# Patient Record
Sex: FEMALE | Race: BLACK OR AFRICAN AMERICAN | NOT HISPANIC OR LATINO | Employment: PART TIME | URBAN - METROPOLITAN AREA
[De-identification: names, ages, dates, MRNs, and addresses within clinical notes are randomized per-mention and may not be internally consistent; named-entity substitution may affect disease eponyms.]

---

## 2019-03-22 ENCOUNTER — HISTORICAL (OUTPATIENT)
Dept: LAB | Facility: HOSPITAL | Age: 19
End: 2019-03-22

## 2019-03-22 LAB
APPEARANCE, UA: CLEAR
BACTERIA SPEC CULT: NORMAL /HPF
BILIRUB UR QL STRIP: NEGATIVE
COLOR UR: YELLOW
GLUCOSE (UA): NEGATIVE
HGB UR QL STRIP: NEGATIVE
KETONES UR QL STRIP: NEGATIVE
LEUKOCYTE ESTERASE UR QL STRIP: NEGATIVE
NITRITE UR QL STRIP: NEGATIVE
PH UR STRIP: 5.5 [PH] (ref 5–9)
PROT UR QL STRIP: NEGATIVE
RBC #/AREA URNS HPF: NORMAL /[HPF]
SP GR UR STRIP: 1.02 (ref 1–1.03)
SQUAMOUS EPITHELIAL, UA: NORMAL
UROBILINOGEN UR STRIP-ACNC: 0.2
WBC #/AREA URNS HPF: NORMAL /[HPF]

## 2019-03-24 LAB — FINAL CULTURE: NO GROWTH

## 2022-02-08 DIAGNOSIS — O26.872 CERVICAL SHORTENING AFFECTING PREGNANCY IN SECOND TRIMESTER: Primary | ICD-10-CM

## 2022-02-08 DIAGNOSIS — J45.909 ASTHMA AFFECTING PREGNANCY IN SECOND TRIMESTER: ICD-10-CM

## 2022-02-08 DIAGNOSIS — O99.512 ASTHMA AFFECTING PREGNANCY IN SECOND TRIMESTER: ICD-10-CM

## 2022-02-08 DIAGNOSIS — O28.3 ECHOGENIC INTRACARDIAC FOCUS OF FETUS ON PRENATAL ULTRASOUND: ICD-10-CM

## 2022-02-09 NOTE — ASSESSMENT & PLAN NOTE
EIF  An echogenic intracardiac focus was noted within the left ventricle of the fetal heart upon routine anatomy ultrasound at her primary OB's office. We discussed the significance of an echogenic focus (EIF) in the ventricle of the fetal heart. This is not a true structural abnormality and is not associated with congenital heart disease or abnormal cardiac function. It is seen as a normal variant in about 5 - 7% of pregnancies. Down syndrome fetuses have a higher incidence of echogenic foci than normal fetuses, therefore it is considered to be a potential marker for fetal Down syndrome. In a woman with other risk factors for Down syndrome (advanced maternal age, elevated quad screen risk or presence of other markers), the presence of an echogenic focus may increase the relative risk of Down syndrome slightly. In a younger woman (< age 35) or in a woman without other risk factors or markers for Down syndrome, the significance of an isolated echogenic focus is minimal. The overwhelming majority (99%) of these fetuses will not have Down syndrome. The presence of an EIF is an indication for a targeted anatomic survey, which was done today. No fetal major structural malformations or other minor markers associated with Down Syndrome were identified. Serum screening, either with a quad screen or cfDNA testing, can be used to provide a quantitative estimate of the chance for fetal Down Syndrome. She has not completed any biochemical screening tests. We have discussed her options and she has politely declined both screening and diagnostic testing.

## 2022-02-09 NOTE — ASSESSMENT & PLAN NOTE
Asthma  Ms. Roque has mild asthma. She currently is well controlled on PRN meds.     Asthma is the most common condition affecting the lungs during pregnancy, occurring in between 4-8 % of pregnant women. During pregnancy, asthma worsens in about one-third of women, improves in one-third, and remains stable in one-third. The risk of poorly controlled asthma is much greater than the risk of taking medications to control symptoms. Asthma exacerbations are frequently seen in the second trimester, and close monitoring is indicated. Patients should avoid exposure to specific allergens and triggers. Moderate persistent and severe persistent asthma are associated with  delivery,  delivery, preeclampsia, fetal growth restriction, and maternal morbidity.     Recommendations:   Continue current medications: Albuterol INH PRN   Avoid triggers   Administer flu vaccination, when available; consider pneumovax if other comorbidities   Recommend COVID vaccination   Avoid prostaglandins such as E2 and F2-alpha (carboprost/Hemabate)

## 2022-02-09 NOTE — ASSESSMENT & PLAN NOTE
Short cervix-no prior  birth  She is being referred for an incidental finding of cervical shortening on routine anatomy ultrasound. Measurements at her primary OB's office were ~22mm. She was appropriately started on vaginal progesterone 200mg QHS.   Today's transvaginal sonography demonstrates an average cervical length of >30mm.    I counseled the patient regarding the potential implications of a short cervix including the risk of  birth. We reviewed the decreased incidence of  birth in women with a short cervix without a history or  birth who were given nightly vaginal progesterone (prometrium 200mg). Patient was counseled to avoid heavy lifting and moderate to high impact exercise. Pelvic rest can be considered but does not affect  birth risk. Bedrest is not recommended due to risk of thromboembolism and no proven benefit with respect to  delivery. I counseled the patient regarding the signs and symptoms or  labor. We reviewed the thresholds of viability and reviewed prematurity complications. We discussed that based on her current cervical length, closed cervix, and no history of a prior  birth that she is not currently a cerclage candidate. We discussed that if the cervix were to dilate, cerclage could be considered up until a gestational age of 22 weeks 6 days. Prometrium is contraindicated in those with a peanut allergy.    Recommendations:   Continue vaginal progesterone 200 mg QHS    We will re-evaluate cervical length in ~2 weeks.   Avoid moderate to high impact exercise and heavy lifting   Strict PTL precautions reviewed with patient   If any concern for cervical dilation prior to 22 weeks and 6 days, and patient does not have PTL symptoms or significant bleeding, will consider physical exam indicated cerclage

## 2022-02-10 ENCOUNTER — PROCEDURE VISIT (OUTPATIENT)
Dept: MATERNAL FETAL MEDICINE | Facility: CLINIC | Age: 22
End: 2022-02-10
Payer: COMMERCIAL

## 2022-02-10 ENCOUNTER — OFFICE VISIT (OUTPATIENT)
Dept: MATERNAL FETAL MEDICINE | Facility: CLINIC | Age: 22
End: 2022-02-10
Payer: COMMERCIAL

## 2022-02-10 VITALS
WEIGHT: 107.81 LBS | HEART RATE: 121 BPM | SYSTOLIC BLOOD PRESSURE: 117 MMHG | HEIGHT: 59 IN | BODY MASS INDEX: 21.73 KG/M2 | DIASTOLIC BLOOD PRESSURE: 70 MMHG

## 2022-02-10 DIAGNOSIS — O26.872 CERVICAL SHORTENING AFFECTING PREGNANCY IN SECOND TRIMESTER: ICD-10-CM

## 2022-02-10 DIAGNOSIS — O43.199 MARGINAL INSERTION OF UMBILICAL CORD AFFECTING MANAGEMENT OF MOTHER: ICD-10-CM

## 2022-02-10 DIAGNOSIS — O28.3 ECHOGENIC INTRACARDIAC FOCUS OF FETUS ON PRENATAL ULTRASOUND: ICD-10-CM

## 2022-02-10 DIAGNOSIS — J45.909 ASTHMA AFFECTING PREGNANCY IN SECOND TRIMESTER: ICD-10-CM

## 2022-02-10 DIAGNOSIS — O99.512 ASTHMA AFFECTING PREGNANCY IN SECOND TRIMESTER: ICD-10-CM

## 2022-02-10 PROCEDURE — 76811 OB US DETAILED SNGL FETUS: CPT | Mod: S$GLB,,, | Performed by: OBSTETRICS & GYNECOLOGY

## 2022-02-10 PROCEDURE — 99204 PR OFFICE/OUTPT VISIT, NEW, LEVL IV, 45-59 MIN: ICD-10-PCS | Mod: 25,S$GLB,, | Performed by: OBSTETRICS & GYNECOLOGY

## 2022-02-10 PROCEDURE — 99204 OFFICE O/P NEW MOD 45 MIN: CPT | Mod: 25,S$GLB,, | Performed by: OBSTETRICS & GYNECOLOGY

## 2022-02-10 PROCEDURE — 76817 TRANSVAGINAL US OBSTETRIC: CPT | Mod: S$GLB,,, | Performed by: OBSTETRICS & GYNECOLOGY

## 2022-02-10 PROCEDURE — 76811 US MFM PROCEDURE (VIEWPOINT): ICD-10-PCS | Mod: S$GLB,,, | Performed by: OBSTETRICS & GYNECOLOGY

## 2022-02-10 PROCEDURE — 76817 US MFM PROCEDURE (VIEWPOINT): ICD-10-PCS | Mod: S$GLB,,, | Performed by: OBSTETRICS & GYNECOLOGY

## 2022-02-10 RX ORDER — BUTALBITAL, ACETAMINOPHEN AND CAFFEINE 300; 40; 50 MG/1; MG/1; MG/1
1 CAPSULE ORAL EVERY 6 HOURS PRN
Status: ON HOLD | COMMUNITY
Start: 2021-12-14 | End: 2022-06-23

## 2022-02-10 RX ORDER — PROGESTERONE 200 MG/1
200 CAPSULE ORAL NIGHTLY
Status: ON HOLD | COMMUNITY
Start: 2022-02-07 | End: 2022-06-23

## 2022-02-10 NOTE — LETTER
February 10, 2022    Mya Ayoub  [address] [facility] [address]             [facility] - Maternal Fetal Med  [address] [facility] [address] Dear employer of Ms. Ayoub:    Mya is under our care for her pregnancy. Please allow her to perform light duty work tasks. Please excuse her from any heavy lifting or long periods of standing. Please allow opportunity for her to sit and take breaks as needed as well as hydrate adequately.     If you have any questions or concerns, please don't hesitate to call.    Sincerely,      Kat Moe NP on behalf of   Tano Tobin III, MD

## 2022-02-10 NOTE — ASSESSMENT & PLAN NOTE
A marginal cord insertion is noted on today's ultrasound evaluation. Since a marginal cord insertion can be associated with poor growth restriction, we recommend re-assessing fetal growth at ~32 weeks EGA.

## 2022-02-10 NOTE — PROGRESS NOTES
"MATERNAL-FETAL MEDICINE   CONSULT NOTE    Provider requesting consultation: Dr. Tucker    SUBJECTIVE:     Ms. Mya Ayoub is a 21 y.o.  female with IUP at 20w2d who is seen in consultation by MFM for evaluation and management of:  Problem   Marginal Insertion of Umbilical Cord Affecting Management of Mother   Cervical Shortening Affecting Pregnancy in Second Trimester   Echogenic Intracardiac Focus of Fetus On Prenatal Ultrasound   Asthma Affecting Pregnancy in Second Trimester (Resolved)            Medication List with Changes/Refills   Current Medications    BUTALBITAL-ACETAMINOPHEN-CAFF -40 MG CAP    Take 1 capsule by mouth every 6 (six) hours as needed.    PRENATAL VIT NO.124/IRON/FOLIC (PRENATAL VITAMIN ORAL)    Take by mouth.    PROGESTERONE (PROMETRIUM) 200 MG CAPSULE    Place 200 mg vaginally nightly.       Review of patient's allergies indicates:  No Known Allergies      PMH:  Past Medical History:   Diagnosis Date    Asthma        PObHx:  OB History    Para Term  AB Living   2       1     SAB IAB Ectopic Multiple Live Births     1            # Outcome Date GA Lbr Haim/2nd Weight Sex Delivery Anes PTL Lv   2 Current            1 IAB 20 6w0d              PSH:  Past Surgical History:   Procedure Laterality Date    INDUCED       TONSILLECTOMY, ADENOIDECTOMY         Family history:family history includes COPD in her maternal grandmother and paternal grandmother; Hypertension in her maternal grandfather and maternal grandmother.    Social history: reports that she has never smoked. She has never used smokeless tobacco. She reports previous alcohol use. She reports that she does not use drugs.    Genetic history: The patient denies any inherited genetic diseases or birth defects in herself or her partner's personal history or family.    Objective:   /70 (BP Location: Right arm)   Pulse (!) 121   Ht 4' 11" (1.499 m)   Wt 48.9 kg (107 lb 12.9 oz)   BMI 21.77 " kg/m²     Ultrasound performed. See viewpoint for full ultrasound report.  1. A detailed fetal anatomic ultrasound examination was performed for the following high risk indication: EIF on routine US  2. No fetal structural malformations are identified; however, fetal imaging is incomplete today with limited CSP. EIF is not present today. A follow-up study will be scheduled to complete the fetal anatomic survey.   3. Fetal size today is consistent with established gestational age.  4. Cervical length is normal on transvaginal assessment, measuring 33 mm.  5. Placental location is normal without evidence of previa. A marginal cord insertion is present  6. Amniotic fluid volume appears normal.     ASSESSMENT/PLAN:     21 y.o.  female with IUP at 20w2d     Cervical shortening affecting pregnancy in second trimester  Short cervix-no prior  birth  She is being referred for an incidental finding of cervical shortening on routine anatomy ultrasound. Measurements at her primary OB's office were ~22mm. She was appropriately started on vaginal progesterone 200mg QHS.   Today's transvaginal sonography demonstrates an average cervical length of >30mm.    I counseled the patient regarding the potential implications of a short cervix including the risk of  birth. We reviewed the decreased incidence of  birth in women with a short cervix without a history or  birth who were given nightly vaginal progesterone (prometrium 200mg). Patient was counseled to avoid heavy lifting and moderate to high impact exercise. Pelvic rest can be considered but does not affect  birth risk. Bedrest is not recommended due to risk of thromboembolism and no proven benefit with respect to  delivery. I counseled the patient regarding the signs and symptoms or  labor. We reviewed the thresholds of viability and reviewed prematurity complications. We discussed that based on her current cervical length,  closed cervix, and no history of a prior  birth that she is not currently a cerclage candidate. We discussed that if the cervix were to dilate, cerclage could be considered up until a gestational age of 22 weeks 6 days. Prometrium is contraindicated in those with a peanut allergy.    Recommendations:   Continue vaginal progesterone 200 mg QHS    We will re-evaluate cervical length in ~2 weeks.   Avoid moderate to high impact exercise and heavy lifting   Strict PTL precautions reviewed with patient   If any concern for cervical dilation prior to 22 weeks and 6 days, and patient does not have PTL symptoms or significant bleeding, will consider physical exam indicated cerclage       Asthma affecting pregnancy in second trimester  Asthma  Ms. Roque has mild asthma. She currently is well controlled on PRN meds.     Asthma is the most common condition affecting the lungs during pregnancy, occurring in between 4-8 % of pregnant women. During pregnancy, asthma worsens in about one-third of women, improves in one-third, and remains stable in one-third. The risk of poorly controlled asthma is much greater than the risk of taking medications to control symptoms. Asthma exacerbations are frequently seen in the second trimester, and close monitoring is indicated. Patients should avoid exposure to specific allergens and triggers. Moderate persistent and severe persistent asthma are associated with  delivery,  delivery, preeclampsia, fetal growth restriction, and maternal morbidity.     Recommendations:   Continue current medications: Albuterol INH PRN   Avoid triggers   Administer flu vaccination, when available; consider pneumovax if other comorbidities   Recommend COVID vaccination   Avoid prostaglandins such as E2 and F2-alpha (carboprost/Hemabate)      Echogenic intracardiac focus of fetus on prenatal ultrasound  EIF  An echogenic intracardiac focus was noted within the left ventricle of the  fetal heart upon routine anatomy ultrasound at her primary OB's office. We discussed the significance of an echogenic focus (EIF) in the ventricle of the fetal heart. This is not a true structural abnormality and is not associated with congenital heart disease or abnormal cardiac function. It is seen as a normal variant in about 5 - 7% of pregnancies. Down syndrome fetuses have a higher incidence of echogenic foci than normal fetuses, therefore it is considered to be a potential marker for fetal Down syndrome. In a woman with other risk factors for Down syndrome (advanced maternal age, elevated quad screen risk or presence of other markers), the presence of an echogenic focus may increase the relative risk of Down syndrome slightly. In a younger woman (< age 35) or in a woman without other risk factors or markers for Down syndrome, the significance of an isolated echogenic focus is minimal. The overwhelming majority (99%) of these fetuses will not have Down syndrome. The presence of an EIF is an indication for a targeted anatomic survey, which was done today. No fetal major structural malformations or other minor markers associated with Down Syndrome were identified. Serum screening, either with a quad screen or cfDNA testing, can be used to provide a quantitative estimate of the chance for fetal Down Syndrome. She has not completed any biochemical screening tests. We have discussed her options and she has politely declined both screening and diagnostic testing.    Marginal insertion of umbilical cord affecting management of mother  A marginal cord insertion is noted on today's ultrasound evaluation. Since a marginal cord insertion can be associated with poor growth restriction, we recommend re-assessing fetal growth at ~32 weeks EGA.    FOLLOW UP:   F/u in 2 weeks for cervical length only  F/u in 12 weeks for US/MFM visit      45 minutes of total time spent on the encounter, which includes face to face time and  non-face to face time preparing to see the patient (eg, review of tests), obtaining and/or reviewing separately obtained history, documenting clinical information in the electronic or other health record, independently interpreting results (not separately reported) and communicating results to the patient/family/caregiver, or care coordination (not separately reported).      Tano Tobin III  Maternal-Fetal Medicine    Electronically Signed by Tano Tobin III February 10, 2022

## 2022-02-10 NOTE — LETTER
February 10, 2022    Mya Ayobu  [address] [facility] [address]             [facility] - Maternal Fetal Med  [address] [facility] [address] Dear employer of Mya Ayoub:    Mya is currently under our care for her pregnancy. Please allow light duty at work to exclude heavy lifting or pushing. Please allow opportunities to sit as needed and hydrate adequately.     If you have any questions or concerns, please don't hesitate to call.    Sincerely,      Kat Moe NP on behalf of   Tano Tobin III, MD

## 2022-02-16 DIAGNOSIS — O26.872 CERVICAL SHORTENING AFFECTING PREGNANCY IN SECOND TRIMESTER: Primary | ICD-10-CM

## 2022-02-24 ENCOUNTER — PROCEDURE VISIT (OUTPATIENT)
Dept: MATERNAL FETAL MEDICINE | Facility: CLINIC | Age: 22
End: 2022-02-24
Payer: COMMERCIAL

## 2022-02-24 VITALS
HEART RATE: 102 BPM | DIASTOLIC BLOOD PRESSURE: 63 MMHG | SYSTOLIC BLOOD PRESSURE: 110 MMHG | BODY MASS INDEX: 22.06 KG/M2 | HEIGHT: 59 IN | WEIGHT: 109.44 LBS

## 2022-02-24 DIAGNOSIS — O26.872 CERVICAL SHORTENING AFFECTING PREGNANCY IN SECOND TRIMESTER: ICD-10-CM

## 2022-02-24 PROCEDURE — 76817 TRANSVAGINAL US OBSTETRIC: CPT | Mod: S$GLB,,, | Performed by: OBSTETRICS & GYNECOLOGY

## 2022-02-24 PROCEDURE — 76815 OB US LIMITED FETUS(S): CPT | Mod: S$GLB,,, | Performed by: OBSTETRICS & GYNECOLOGY

## 2022-02-24 PROCEDURE — 76815 US MFM PROCEDURE (VIEWPOINT): ICD-10-PCS | Mod: S$GLB,,, | Performed by: OBSTETRICS & GYNECOLOGY

## 2022-02-24 PROCEDURE — 76817 US MFM PROCEDURE (VIEWPOINT): ICD-10-PCS | Mod: S$GLB,,, | Performed by: OBSTETRICS & GYNECOLOGY

## 2022-03-10 ENCOUNTER — HISTORICAL (OUTPATIENT)
Dept: ADMINISTRATIVE | Facility: HOSPITAL | Age: 22
End: 2022-03-10

## 2022-03-10 LAB
CBG: 75 (ref 70–115)
GLUCOSE 1H P 100 G GLC PO SERPL-MCNC: 117 MG/DL (ref 100–180)
GLUCOSE BS SERPL-MCNC: 58 MG/DL (ref 70–115)

## 2022-03-11 ENCOUNTER — HISTORICAL (OUTPATIENT)
Dept: ADMINISTRATIVE | Facility: HOSPITAL | Age: 22
End: 2022-03-11

## 2022-03-11 LAB
GLUCOSE 1H P 100 G GLC PO SERPL-MCNC: 109 MG/DL (ref 100–180)
GLUCOSE 2H P 100 G GLC PO SERPL-MCNC: 130 MG/DL (ref 70–140)
GLUCOSE 3H P 100 G GLC PO SERPL-MCNC: 110 MG/DL (ref 70–115)
GLUCOSE BS SERPL-MCNC: 62 MG/DL (ref 70–115)

## 2022-03-13 LAB — CBG: 78 (ref 70–115)

## 2022-03-21 ENCOUNTER — HISTORICAL (OUTPATIENT)
Dept: ADMINISTRATIVE | Facility: HOSPITAL | Age: 22
End: 2022-03-21

## 2022-03-21 ENCOUNTER — HOSPITAL ENCOUNTER (OUTPATIENT)
Dept: OBSTETRICS AND GYNECOLOGY | Facility: HOSPITAL | Age: 22
End: 2022-03-21
Attending: OBSTETRICS & GYNECOLOGY | Admitting: OBSTETRICS & GYNECOLOGY

## 2022-03-21 ENCOUNTER — OUTSIDE PLACE OF SERVICE (OUTPATIENT)
Dept: ADMINISTRATIVE | Facility: OTHER | Age: 22
End: 2022-03-21
Payer: COMMERCIAL

## 2022-03-21 LAB
ABS NEUT (OLG): 5.92 (ref 2.1–9.2)
ALBUMIN SERPL-MCNC: 3.4 G/DL (ref 3.5–5)
ALBUMIN/GLOB SERPL: 1.2 {RATIO} (ref 1.1–2)
ALP SERPL-CCNC: 89 U/L (ref 40–150)
ALT SERPL-CCNC: 21 U/L (ref 0–55)
APPEARANCE, UA: CLEAR
AST SERPL-CCNC: 25 U/L (ref 5–34)
BACTERIA SPEC CULT: NORMAL
BASOPHILS # BLD AUTO: 0 10*3/UL (ref 0–0.2)
BASOPHILS NFR BLD AUTO: 0 %
BILIRUB SERPL-MCNC: 0.2 MG/DL
BILIRUB UR QL STRIP: NEGATIVE
BILIRUBIN DIRECT+TOT PNL SERPL-MCNC: 0.1 (ref 0–0.5)
BILIRUBIN DIRECT+TOT PNL SERPL-MCNC: 0.1 (ref 0–0.8)
BUDDING YEAST: NORMAL
BUN SERPL-MCNC: 6.3 MG/DL (ref 7–18.7)
CALCIUM SERPL-MCNC: 8.8 MG/DL (ref 8.7–10.5)
CASTS, UA: NORMAL
CHLORIDE SERPL-SCNC: 105 MMOL/L (ref 98–107)
CO2 SERPL-SCNC: 22 MMOL/L (ref 22–29)
COLOR UR: YELLOW
CREAT SERPL-MCNC: 0.66 MG/DL (ref 0.55–1.02)
CRYSTALS: NORMAL
EOSINOPHIL # BLD AUTO: 0 10*3/UL (ref 0–0.9)
EOSINOPHIL NFR BLD AUTO: 1 %
ERYTHROCYTE [DISTWIDTH] IN BLOOD BY AUTOMATED COUNT: 13.2 % (ref 11.5–17)
GLOBULIN SER-MCNC: 2.9 G/DL (ref 2.4–3.5)
GLUCOSE (UA): NEGATIVE
GLUCOSE SERPL-MCNC: 58 MG/DL (ref 74–100)
HCT VFR BLD AUTO: 35.6 % (ref 37–47)
HEMOLYSIS INTERF INDEX SERPL-ACNC: 0
HGB BLD-MCNC: 11.2 G/DL (ref 12–16)
HGB UR QL STRIP: NEGATIVE
ICTERIC INTERF INDEX SERPL-ACNC: 0
KETONES UR QL STRIP: NORMAL
LEUKOCYTE ESTERASE UR QL STRIP: NORMAL
LIPEMIC INTERF INDEX SERPL-ACNC: 4
LYMPHOCYTES # BLD AUTO: 1.5 10*3/UL (ref 0.6–4.6)
LYMPHOCYTES NFR BLD AUTO: 18 %
MANUAL DIFF? (OHS): NO
MCH RBC QN AUTO: 27.7 PG (ref 27–31)
MCHC RBC AUTO-ENTMCNC: 31.5 G/DL (ref 33–36)
MCV RBC AUTO: 88.1 FL (ref 80–94)
MONOCYTES # BLD AUTO: 0.6 10*3/UL (ref 0.1–1.3)
MONOCYTES NFR BLD AUTO: 7 %
NEUTROPHILS # BLD AUTO: 5.92 10*3/UL (ref 2.1–9.2)
NEUTROPHILS NFR BLD AUTO: 73 %
NITRITE UR QL STRIP: NEGATIVE
PH UR STRIP: 7 [PH] (ref 5–9)
PLATELET # BLD AUTO: 205 10*3/UL (ref 130–400)
PMV BLD AUTO: 11.2 FL (ref 9.4–12.4)
POTASSIUM SERPL-SCNC: 3.9 MMOL/L (ref 3.5–5.1)
PROT SERPL-MCNC: 6.3 G/DL (ref 6.4–8.3)
PROT UR QL STRIP: NEGATIVE
RBC # BLD AUTO: 4.04 10*6/UL (ref 4.2–5.4)
RBC #/AREA URNS HPF: NORMAL /[HPF] (ref 0–2)
SMALL ROUND CELLS, UA: NORMAL
SODIUM SERPL-SCNC: 136 MMOL/L (ref 136–145)
SP GR UR STRIP: 1.02 (ref 1–1.03)
SPERM URNS QL MICRO: NORMAL
SQUAMOUS EPITHELIAL, UA: NORMAL (ref 0–4)
T PALLIDUM AB SER QL: NONREACTIVE
UROBILINOGEN UR STRIP-ACNC: 0.2
WBC # SPEC AUTO: 8.1 10*3/UL (ref 4.5–11.5)
WBC #/AREA URNS HPF: NORMAL /[HPF] (ref 0–2)

## 2022-03-21 PROCEDURE — 99222 1ST HOSP IP/OBS MODERATE 55: CPT | Mod: ,,, | Performed by: NURSE PRACTITIONER

## 2022-03-21 PROCEDURE — 99222 PR INITIAL HOSPITAL CARE,LEVL II: ICD-10-PCS | Mod: ,,, | Performed by: NURSE PRACTITIONER

## 2022-03-22 DIAGNOSIS — O26.872 CERVICAL SHORTENING AFFECTING PREGNANCY IN SECOND TRIMESTER: Primary | ICD-10-CM

## 2022-03-22 DIAGNOSIS — O28.3 ECHOGENIC INTRACARDIAC FOCUS OF FETUS ON PRENATAL ULTRASOUND: ICD-10-CM

## 2022-04-04 ENCOUNTER — PROCEDURE VISIT (OUTPATIENT)
Dept: MATERNAL FETAL MEDICINE | Facility: CLINIC | Age: 22
End: 2022-04-04
Payer: COMMERCIAL

## 2022-04-04 VITALS — HEART RATE: 109 BPM | SYSTOLIC BLOOD PRESSURE: 108 MMHG | DIASTOLIC BLOOD PRESSURE: 63 MMHG

## 2022-04-04 DIAGNOSIS — O26.872 CERVICAL SHORTENING AFFECTING PREGNANCY IN SECOND TRIMESTER: ICD-10-CM

## 2022-04-04 DIAGNOSIS — O28.3 ECHOGENIC INTRACARDIAC FOCUS OF FETUS ON PRENATAL ULTRASOUND: ICD-10-CM

## 2022-04-04 PROCEDURE — 76816 US MFM PROCEDURE (VIEWPOINT): ICD-10-PCS | Mod: S$GLB,,, | Performed by: OBSTETRICS & GYNECOLOGY

## 2022-04-04 PROCEDURE — 76816 OB US FOLLOW-UP PER FETUS: CPT | Mod: S$GLB,,, | Performed by: OBSTETRICS & GYNECOLOGY

## 2022-04-21 DIAGNOSIS — O28.3 ECHOGENIC INTRACARDIAC FOCUS OF FETUS ON PRENATAL ULTRASOUND: Primary | ICD-10-CM

## 2022-04-21 DIAGNOSIS — O26.873 CERVICAL SHORTENING AFFECTING PREGNANCY IN THIRD TRIMESTER: ICD-10-CM

## 2022-05-05 ENCOUNTER — PROCEDURE VISIT (OUTPATIENT)
Dept: MATERNAL FETAL MEDICINE | Facility: CLINIC | Age: 22
End: 2022-05-05
Payer: COMMERCIAL

## 2022-05-05 ENCOUNTER — OFFICE VISIT (OUTPATIENT)
Dept: MATERNAL FETAL MEDICINE | Facility: CLINIC | Age: 22
End: 2022-05-05
Payer: COMMERCIAL

## 2022-05-05 VITALS — HEART RATE: 115 BPM | SYSTOLIC BLOOD PRESSURE: 117 MMHG | DIASTOLIC BLOOD PRESSURE: 69 MMHG

## 2022-05-05 DIAGNOSIS — O26.873 CERVICAL SHORTENING AFFECTING PREGNANCY IN THIRD TRIMESTER: ICD-10-CM

## 2022-05-05 DIAGNOSIS — O43.199 MARGINAL INSERTION OF UMBILICAL CORD AFFECTING MANAGEMENT OF MOTHER: ICD-10-CM

## 2022-05-05 DIAGNOSIS — O28.3 ECHOGENIC INTRACARDIAC FOCUS OF FETUS ON PRENATAL ULTRASOUND: ICD-10-CM

## 2022-05-05 PROCEDURE — 76819 FETAL BIOPHYS PROFIL W/O NST: CPT | Mod: S$GLB,,, | Performed by: OBSTETRICS & GYNECOLOGY

## 2022-05-05 PROCEDURE — 76816 OB US FOLLOW-UP PER FETUS: CPT | Mod: S$GLB,,, | Performed by: OBSTETRICS & GYNECOLOGY

## 2022-05-05 PROCEDURE — 76816 PR  US,PREGNANT UTERUS,F/U,TRANSABD APP: ICD-10-PCS | Mod: S$GLB,,, | Performed by: OBSTETRICS & GYNECOLOGY

## 2022-05-05 PROCEDURE — 76819 PR US, OB, FETAL BIOPHYSICAL, W/O NST: ICD-10-PCS | Mod: S$GLB,,, | Performed by: OBSTETRICS & GYNECOLOGY

## 2022-05-05 PROCEDURE — 99213 OFFICE O/P EST LOW 20 MIN: CPT | Mod: 25,S$GLB,, | Performed by: OBSTETRICS & GYNECOLOGY

## 2022-05-05 PROCEDURE — 99213 PR OFFICE/OUTPT VISIT, EST, LEVL III, 20-29 MIN: ICD-10-PCS | Mod: 25,S$GLB,, | Performed by: OBSTETRICS & GYNECOLOGY

## 2022-05-05 RX ORDER — NIFEDIPINE 10 MG/1
10 CAPSULE ORAL EVERY 6 HOURS PRN
Status: ON HOLD | COMMUNITY
Start: 2022-04-30 | End: 2022-06-23

## 2022-05-05 NOTE — PROGRESS NOTES
Maternal Fetal Medicine follow up consult    SUBJECTIVE:     Mya Ayoub is a 21 y.o.  female with IUP at 32w2d who is seen in follow up consultation by MFM.  Pregnancy complications include:   Problem   Marginal Insertion of Umbilical Cord Affecting Management of Mother   Cervical Shortening Affecting Pregnancy in Third Trimester       Previous notes reviewed.   No changes to medical, surgical, family, social, or obstetric history.    Interval history since last MFM visit: no changes    Medications:  Current Outpatient Medications   Medication Instructions    butalbital-acetaminophen-caff -40 mg Cap 1 capsule, Oral, Every 6 hours PRN    NIFEdipine (PROCARDIA) 10 mg, Oral, Every 6 hours PRN    prenatal vit no.124/iron/folic (PRENATAL VITAMIN ORAL) Oral    progesterone (PROMETRIUM) 200 mg, Vaginal, Nightly       Care team members:  Dr Tucker- Primary OB       OBJECTIVE:   /69 (BP Location: Left arm)   Pulse (!) 115     Ultrasound performed. See viewpoint for full ultrasound report.    Fetal size is AGA with the EFW at the 18% and the AC at the 44%.  A limited repeat fetal anatomic survey is normal.   AFV is normal.   BP   ASSESSMENT/PLAN:     21 y.o.  female with IUP at 32w2d    Cervical shortening affecting pregnancy in third trimester  She was previously diagnosed with a short cervix in the second trimester. Her cervical length remained stable and she is without complaints. She is continuing with vaginal progesterone.    She was previously placed in hospital observation in the second trimester for  contractions. She ultimately discharged to home and placed on a PRN Nifedipine regimen. She reports taking Nifedipine once in the AM on the days she goes to work. Otherwise, she has not utilized this medication.  I explained that we typically do not attempt to prolonged pregnancy beyond 34 weeks in women with  labor, but since she is really taking the nifedipine more for   contractions and to avoid frequent trips to OB triage, I think it is not unreasonable for her to take it p.r.n. up until 36 weeks as long as she is only doing it once a day.  labor precautions provided.     Recommendations:   Continue vaginal progesterone 200 mg QHS until 37 weeks.    Avoid moderate to high impact exercise and heavy lifting       Marginal insertion of umbilical cord affecting management of mother  A marginal cord insertion was previously noted on ultrasound evaluation. Since a marginal cord insertion can be associated with poor growth restriction, we re-assessed fetal growth at today's visit. Overall EFW: 18%, AC 44%  We discussed that given the normal fetal growth at this point, it is unlikely that there would be any development of any significant pathologic growth restriction.      We have not scheduled any follow-up with MFM at this time, but would be happy to see her again should any questions, concerns, or issues arise.    Today I have spent 25 minutes in the care of the patient. This includes face to face time and non-face to face time preparing to see the patient (eg, review of tests), obtaining and/or reviewing separately obtained history, documenting clinical information in the electronic or other health record, independently interpreting results and communicating results to the patient/family/caregiver, or care coordination.

## 2022-05-05 NOTE — ASSESSMENT & PLAN NOTE
She was previously diagnosed with a short cervix in the second trimester. Her cervical length remained stable and she is without complaints. She is continuing with vaginal progesterone.    She was previously placed in hospital observation in the second trimester for  contractions. She ultimately discharged to home and placed on a PRN Nifedipine regimen. She reports taking Nifedipine once in the AM on the days she goes to work. Otherwise, she has not utilized this medication.  I explained that we typically do not attempt to prolonged pregnancy beyond 34 weeks in women with  labor, but since she is really taking the nifedipine more for  contractions and to avoid frequent trips to OB triage, I think it is not unreasonable for her to take it p.r.n. up until 36 weeks as long as she is only doing it once a day.  labor precautions provided.     Recommendations:   Continue vaginal progesterone 200 mg QHS until 37 weeks.    Avoid moderate to high impact exercise and heavy lifting

## 2022-05-05 NOTE — ASSESSMENT & PLAN NOTE
A marginal cord insertion was previously noted on ultrasound evaluation. Since a marginal cord insertion can be associated with poor growth restriction, we re-assessed fetal growth at today's visit. Overall EFW: 18%, AC 44%  We discussed that given the normal fetal growth at this point, it is unlikely that there would be any development of any significant pathologic growth restriction.

## 2022-05-14 NOTE — ED PROVIDER NOTES
Patient:   Mya Ayoub            MRN: [id number]            FIN: [id number]               Age:   21 years     Sex:  Female     :  2000   Associated Diagnoses:   None   Author:   Klarissa Del Rio MD      Basic Information   Additional information: Chief Complaint from Nursing Triage Note : Chief Complaint   3/21/2022 11:51 CDT      Chief Complaint           IUP @ 25.6wks- contractions q 10 min since . Has been to hospital the past 2 days where she has received Brethine and Procardi. She called her doctor this morning and instructed to come back in to be evalauted    3/20/2022 14:25 CDT      Chief Complaint           IUP 25.5w c/o N/V and abdominal pain  .       DUPLICATE NOTE ERROR      History of Present Illness   The patient presents with (subjective data):   Fetal Movement: Present  Primary OB Provider: Carol Tucker MD  Pregnancy Status /Para info   OB Hx  OBHx : 1.  Contractions: Contraction info ST   Uterine Contraction Monitoring Method: External toco (22 11:51:00).        Health Status   Allergies:    Allergic Reactions (All)  No Known Allergies,    Allergies (1) Active Reaction  No Known Allergies None Documented  .   Medications:  (Selected)   Inpatient Medications  Ordered  Toradol Oral Tab: 10 mg, form: Tab, Oral, Now, first dose 16 22:29:00 CDT, stop date 16 22:29:00 CDT, [id number],652  Documented Medications  Documented  Prenatal 1 Plus 1 (Pt. Own): Oral, Daily, 0 Refill(s)  acetaminophen/butalbital/caffeine 300 mg-50 mg-40 mg oral capsule: 1 cap(s), Oral, q6hr  progesterone 200 mg oral capsule: 200 mg, Oral, qPM.      Review of Systems   General Well  HENT_normal  Respiratory_normal  Cardiovascular_normal  Gastrointestinal_normal  Genitourinary_normal  Musculoskeletal_normal  Integumentary_normal         Past Medical/ Family/ Social History   Medical history:    No active or resolved past medical history items have been selected or recorded..    Surgical history:    tonsils..   Family history:    No family history items have been selected or recorded..   Social history:    Social & Psychosocial Habits    Tobacco  07/29/2016  Use: Never smoker    03/20/2022  Use: Never (less than 100 in l    Patient Wants Consult For Cessation Counseling N/A    Abuse/Neglect  03/20/2022  SHX Any signs of abuse or neglect No    Feels unsafe at home: No    Safe place to go: Yes  .   Problem list:    Active Problems (2)  Asthma   Pregnant   .      Physical Examination               Vital Signs   Vital Signs   3/21/2022 11:51 CDT      Temperature Oral          37.0 DegC                             Temperature Oral (calculated)             98.60 DegF                             Peripheral Pulse Rate     95 bpm                             Respiratory Rate          16 br/min                             Systolic Blood Pressure   109 mmHg                             Diastolic Blood Pressure  72 mmHg                             Mean Arterial Pressure, Cuff              84 mmHg                             Blood Pressure Location   Left arm                             Blood Pressure Cuff Size  Adult long    3/20/2022 16:01 CDT      Peripheral Pulse Rate     105 bpm  HI                             Respiratory Rate          18 br/min                             Systolic Blood Pressure   103 mmHg                             Diastolic Blood Pressure  64 mmHg                             Mean Arterial Pressure, Cuff              77 mmHg    3/20/2022 14:55 CDT      Peripheral Pulse Rate     100 bpm                             Respiratory Rate          16 br/min                             Systolic Blood Pressure   103 mmHg                             Diastolic Blood Pressure  68 mmHg                             Mean Arterial Pressure, Cuff              80 mmHg    3/20/2022 14:25 CDT      Temperature Temporal Artery               36.0 DegC  LOW                             Peripheral Pulse  Rate     102 bpm  HI                             Respiratory Rate          18 br/min                             Oxygen Therapy            Room air                             Systolic Blood Pressure   109 mmHg                             Diastolic Blood Pressure  75 mmHg                             Mean Arterial Pressure, Cuff              86 mmHg                             Blood Pressure Location   Left arm                             Blood Pressure Cuff Size  Adult long  .      Vital Signs (last 24 hrs)_____  Last Charted___________  Temp Oral     37.0 DegC  (MAR 21 11:51)  Heart Rate Peripheral   95 bpm  (MAR 21 11:51)  Resp Rate         16 br/min  (MAR 21 11:51)  SBP      109 mmHg  (MAR 21 11:51)  DBP      72 mmHg  (MAR 21 11:51)  .   Measurements   3/21/2022 11:56 CDT      Weight Dosing             51.2 kg                             Weight Measured and Calculated in Lbs     112.88 lb                             Weight Estimated          51.2 kg                             Height/Length Dosing      149.8 cm                             Height/Length Estimated   149.8 cm                             BSA Estimated             1.46 m2                             Body Mass Index Estimated 22.82 kg/m2    3/20/2022 14:25 CDT      Weight Estimated          51.2 kg                             Height/Length Dosing      150 cm  .   Basic Oxygen Information   3/20/2022 14:25 CDT      Oxygen Therapy            Room air  .   Genitourinary:  Vaginal status info ST   No qualifying data available, Membrane status info ST   No qualifying data available.    General- Well, appears as stated age  Abdomen- Soft NT no rebound/guarding  GYN- no urethral discharge, no vaginal or vulvar lesions, Uterus mobile non tender  Pelvic examdone by staff  Lower extremitybilateral no edema or tenderness    Electronic fetal heart:  Tocometer:      Medical Decision Making   Tests pending:  Prenatal care info ST   No qualifying data available.    Results review:     No qualifying data available.      Reexamination/ Reevaluation   Vital signs   Basic Oxygen Information   3/20/2022 14:25 CDT      Oxygen Therapy            Room air

## 2022-05-14 NOTE — H&P
Patient:   Mya Ayoub            MRN: [id number]            FIN: [id number]               Age:   21 years     Sex:  Female     :  2000   Associated Diagnoses:   Pregnant state, incidental   Author:   Scarlett Mallory MD      Basic Information   Source of history:  Self.    Referral source:  Self, Not emergency department.       Chief Complaint       3/21/2022 11:51 CDT      IUP @ 25.6wks- contractions q 10 min since . Has been to hospital the past 2 days where she has received Brethine and Procardi. She called her doctor this morning and instructed to come back in to be evalauted    3/20/2022 14:25 CDT      IUP 25.5w c/o N/V and abdominal pain        History of Present Illness   Ms Ayoub is a  at 25+6 wga who presents with c/o uterine contractions throughout the weekend and continued today.  She notes that she suffered viral gastroenteritis friday and saturday and noticed the contractions began then.  She visited [facility]'s at [facility] and [facility] over the weekend where she received IVF and discharged in stable condition.    Pt gives report of uncomplicated prenatal course under care of Dr Tucker significant for CL 2 cm at 20 u/s incidentally noted.  She has been followed by MFM since then and has remained stable in that regard.   Denies vaginal bleeding and reports good fetal mvmt      Review of Systems   Constitutional:  Negative.    Eye:  Negative.    Ear/Nose/Mouth/Throat:  Negative.    Respiratory:  Negative.    Cardiovascular:  Negative.    Gastrointestinal:  Negative.    Hematology/Lymphatics:  Negative.    Endocrine:  Negative.    Neurologic:  Negative.    All other systems are negative      Health Status   Allergies:    Allergic Reactions (Selected)  No Known Allergies,    Allergies (1) Active Reaction  No Known Allergies None Documented     Current medications:  (Selected)   Inpatient Medications  Ordered  Celestone: 12 mg, form: Injection, IM, q24hr, Order duration: 2 dose(s),  first dose 03/21/22 12:37:00 CDT, stop date 03/23/22 12:36:00 CDT, STAT  [id number] 1,000 mL: 1,000 mL, 1,000 mL, IV, 125 mL/hr, start date 03/21/22 14:16:00 CDT, 1.45, m2  Nubain: 10 mg, form: Injection, IV Push, q2hr PRN for pain, first dose 03/21/22 13:45:00 CDT  Pepcid (for IV Push): 20 mg, form: Injection, Oral, BID, first dose 03/21/22 21:00:00 CDT  Procardia 10 mg oral capsule: 10 mg, form: Cap, Oral, q6hr PRN for other (see comment), first dose 03/21/22 13:36:00 CDT, Routine, Hold for BP <90/50. May titrate to q4 hours if persistent contractions.begin after load dose 20mg (please edit time)  Toradol Oral Tab: 10 mg, form: Tab, Oral, Now, first dose 07/29/16 22:29:00 CDT, stop date 07/29/16 22:29:00 CDT, [phone],652  Tums 500: 500 mg, form: Tab-Chew, Chewed, TID PRN for indigestion, first dose 03/21/22 12:41:00 CDT, STAT  Zofran 2 mg/mL injectable solution: 2 mg, form: Injection, IV Push, q4hr PRN for nausea, first dose 03/21/22 12:40:00 CDT, STAT  Prescriptions  Prescribed  Procardia 10 mg oral capsule: 10 mg = 1 cap(s), Oral, q6hr, PRN PRN other (see comment), # 30 cap(s), 0 Refill(s), Pharmacy: [facility], 149.8, cm, Height/Length Dosing, 03/21/22 13:23:00 CDT, 51.2, kg, Weight Dosing, 03/21/22 13:23:00 CDT,    Medications (7) Active  Scheduled: (2)  betamet acet-betamet Na phos 30 mg/5 ml Tami MDV  12 mg 2 mL, IM, q24hr  famotidine 10 mg/ml Inj-2ml  20 mg 2 mL, Oral, BID  Continuous: (1)  D5LR 1,000 mL  1,000 mL, IV, 125 mL/hr  PRN: (4)  calcium carbonate(TUMS) 500 mg Jasmin  500 mg 1 tab(s), Chewed, TID  nalbuphine 10 mg/ml Inj-1 ml  10 mg 1 mL, IV Push, q2hr  nifedipine 10mg cap  10 mg 1 cap(s), Oral, q6hr  ondansetron 2 mg/mL inj - 2mL  2 mg 1 mL, IV Push, q4hr        Histories   Past Medical History:    No active or resolved past medical history items have been selected or recorded.   Family History:    No family history items have been selected or recorded.   Procedure  history:    tonsils.   Social History        Social & Psychosocial Habits    Tobacco  07/29/2016  Use: Never smoker    03/20/2022  Use: Never (less than 100 in l    Patient Wants Consult For Cessation Counseling N/A    Abuse/Neglect  03/20/2022  SHX Any signs of abuse or neglect No    Feels unsafe at home: No    Safe place to go: Yes  .        Physical Examination   General:  Alert and oriented.    Eye:  Pupils are equal, round and reactive to light.    HENT:  Normocephalic, Normal hearing.    Neck:  Supple, Non-tender.    Respiratory:  Lungs are clear to auscultation.    Cardiovascular:  Normal rate.    Gastrointestinal:  gravid, nontender.    Vital Signs   3/21/2022 15:53 CDT      Temperature Oral          36.8 DegC                             Temperature Oral (calculated)             98.24 DegF                             Heart Rate Monitored      118 bpm  HI                             Respiratory Rate          18 br/min                             Systolic Blood Pressure   107 mmHg                             Diastolic Blood Pressure  62 mmHg                             Mean Arterial Pressure, Cuff              77 mmHg    3/21/2022 14:06 CDT      Heart Rate Monitored      90 bpm                             Systolic Blood Pressure   101 mmHg                             Diastolic Blood Pressure  66 mmHg                             Mean Arterial Pressure, Cuff              78 mmHg    3/21/2022 13:59 CDT      Temperature Oral          37.0 DegC                             Temperature Oral (calculated)             98.60 DegF                             Peripheral Pulse Rate     95 bpm                             Respiratory Rate          16 br/min                             Oxygen Therapy            Room air                             Systolic Blood Pressure   109 mmHg                             Diastolic Blood Pressure  72 mmHg                             Mean Arterial Pressure, Cuff              84 mmHg                              Blood Pressure Location   Left arm    3/21/2022 11:51 CDT      Temperature Oral          37.0 DegC                             Temperature Oral (calculated)             98.60 DegF                             Peripheral Pulse Rate     95 bpm                             Respiratory Rate          16 br/min                             Systolic Blood Pressure   109 mmHg                             Diastolic Blood Pressure  72 mmHg                             Mean Arterial Pressure, Cuff              84 mmHg                             Blood Pressure Location   Left arm                             Blood Pressure Cuff Size  Adult long    3/20/2022 16:01 CDT      Peripheral Pulse Rate     105 bpm  HI                             Respiratory Rate          18 br/min                             Systolic Blood Pressure   103 mmHg                             Diastolic Blood Pressure  64 mmHg                             Mean Arterial Pressure, Cuff              77 mmHg    3/20/2022 14:55 CDT      Peripheral Pulse Rate     100 bpm                             Respiratory Rate          16 br/min                             Systolic Blood Pressure   103 mmHg                             Diastolic Blood Pressure  68 mmHg                             Mean Arterial Pressure, Cuff              80 mmHg    3/20/2022 14:25 CDT      Temperature Temporal Artery               36.0 DegC  LOW                             Peripheral Pulse Rate     102 bpm  HI                             Respiratory Rate          18 br/min                             Oxygen Therapy            Room air                             Systolic Blood Pressure   109 mmHg                             Diastolic Blood Pressure  75 mmHg                             Mean Arterial Pressure, Cuff              86 mmHg                             Blood Pressure Location   Left arm                             Blood Pressure Cuff Size  Adult long     Genitourinary:  No  costovertebral angle tenderness.    Musculoskeletal:  Normal range of motion, Normal strength.    Integumentary:  Warm, Dry, Pink.    Neurologic:  Alert, Oriented, Normal sensory, Normal motor function, No focal deficits, Cranial Nerves II-XII are grossly intact.    Cognition and Speech:  Oriented, Speech clear and coherent, Functional cognition intact.    Psychiatric:  Cooperative.         Mood and affect: Calm.         Thought process: Appropriate.    TOCO:  irreg contractions and irritability on admission, no quiet toco  FHT:  cat 1 for GA  SVE:  deferred.  CL 3.5 cm      Review / Management   Results review:     Labs (Last four charted values)  WBC                  8.1 (MAR 21)   Hgb                  L 11.2 (MAR 21)   Hct                  L 35.6 (MAR 21)   Plt                  205 (MAR 21)   Na                   136 (MAR 21)   K                    3.9 (MAR 21)   CO2                  22 (MAR 21)   Cl                   105 (MAR 21)   Cr                   0.66 (MAR 21)   BUN                  L 6.3 (MAR 21)   Glucose Random       L 58 (MAR 21) .       Impression and Plan   Diagnosis     Pregnant state, incidental ([id number] Z33.1).     Course:  Progressing as expected.     Mya has remained stable throughout the afternoon and is ready for discharge  appreciate MFM consult and reassurance while here under observation  recommend procardia prn  f/u with MFM and Dr Tucker as scheduled  precautions reviewed

## 2022-05-14 NOTE — ED PROVIDER NOTES
Patient:   Mya Ayoub            MRN: [id number]            FIN: [id number]               Age:   21 years     Sex:  Female     :  2000   Associated Diagnoses:   None   Author:   Klarissa Del Rio MD      Basic Information   Additional information: Chief Complaint from Nursing Triage Note : Chief Complaint   3/21/2022 11:51 CDT      Chief Complaint           IUP @ 25.6wks- contractions q 10 min since . Has been to hospital the past 2 days where she has received Brethine and Procardi. She called her doctor this morning and instructed to come back in to be evalauted    3/20/2022 14:25 CDT      Chief Complaint           IUP 25.5w c/o N/V and abdominal pain  .      History of Present Illness   The patient presents with (subjective data):   Fetal Movement: Present  Primary OB Provider: Carol Tucker MD  Pregnancy Status /Para info   OB Hx  OBHx : 1.  Contractions: Contraction info ST   Uterine Contraction Monitoring Method: External toco (22 11:51:00).         Pt os a 22yo  with IUP 25w6d presenting 3rd day in row complaining of abdominal discomfort.  On Saturday she was given Brethine and [facility] and then she presented to our triage for complaint of nausea vomiting which responded to Phenergan and Procardia.  She was given a prescription for Phenergan which had helped her nausea and she has been trying to stay hydrated.  However she, she has felt an increase in her cramping and contraction-type pain.  She denies any leakage of fluid or vaginal bleeding.  Denies any vaginal discharge and reports good fetal movement.  She is followed with maternal fetal movement medicine clinic for incidental finding of short cervix in the 2nd trimester.  She is taking vaginal progesterone.  She denies any dysuria, fever, chills, severe abdominal pain or low back pain/flank pain.      Health Status   Allergies:    Allergic Reactions (All)  No Known  Allergies,    Allergies (1) Active Reaction  No Known Allergies None Documented  .   Medications:  (Selected)   Inpatient Medications  Ordered  Celestone: 12 mg, form: Injection, IM, q24hr, Order duration: 2 dose(s), first dose 03/21/22 12:37:00 CDT, stop date 03/23/22 12:36:00 CDT, STAT  IVF Lactated Ringers LR Infusion 1,000 mL: 1,000 mL, 1,000 mL, IV, 125 mL/hr, start date 03/21/22 12:37:00 CDT, 1.45, m2  Pepcid (for IV Push): 20 mg, form: Injection, Oral, BID, first dose 03/21/22 21:00:00 CDT  Procardia 10 mg oral capsule: 10 mg, form: Cap, Oral, q6hr, first dose 03/21/22 18:30:00 CDT, Routine, Hold for BP <90/50. May titrate to q4 hours if persistent contractions.begin after load dose 20mg (please edit time)  Procardia 10 mg oral capsule: 20 mg, form: Cap, Oral, Once, first dose 03/21/22 12:38:00 CDT, stop date 03/21/22 12:38:00 CDT, STAT  Toradol Oral Tab: 10 mg, form: Tab, Oral, Now, first dose 07/29/16 22:29:00 CDT, stop date 07/29/16 22:29:00 CDT, 991,652  Tums 500: 500 mg, form: Tab-Chew, Chewed, TID PRN for indigestion, first dose 03/21/22 12:41:00 CDT, STAT  Zofran 2 mg/mL injectable solution: 2 mg, form: Injection, IV Push, q4hr PRN for nausea, first dose 03/21/22 12:40:00 CDT, STAT  Documented Medications  Documented  Prenatal 1 Plus 1 (Pt. Own): Oral, Daily, 0 Refill(s)  acetaminophen/butalbital/caffeine 300 mg-50 mg-40 mg oral capsule: 1 cap(s), Oral, q6hr  progesterone 200 mg oral capsule: 200 mg, Oral, qPM.      Review of Systems   General Well  HENT_normal  Respiratory_normal  Cardiovascular_normal  Gastrointestinal_normal  Genitourinary_normal  Musculoskeletal_normal  Integumentary_normal         Past Medical/ Family/ Social History   Medical history:    No active or resolved past medical history items have been selected or recorded..   Surgical history:    tonsils..   Family history:    No family history items have been selected or recorded..   Social history:    Social & Psychosocial  Habits    Tobacco  07/29/2016  Use: Never smoker    03/20/2022  Use: Never (less than 100 in l    Patient Wants Consult For Cessation Counseling N/A    Abuse/Neglect  03/20/2022  SHX Any signs of abuse or neglect No    Feels unsafe at home: No    Safe place to go: Yes  .   Problem list:    Active Problems (2)  Asthma   Pregnant   .      Physical Examination               Vital Signs   Vital Signs   3/21/2022 11:51 CDT      Temperature Oral          37.0 DegC                             Temperature Oral (calculated)             98.60 DegF                             Peripheral Pulse Rate     95 bpm                             Respiratory Rate          16 br/min                             Systolic Blood Pressure   109 mmHg                             Diastolic Blood Pressure  72 mmHg                             Mean Arterial Pressure, Cuff              84 mmHg                             Blood Pressure Location   Left arm                             Blood Pressure Cuff Size  Adult long    3/20/2022 16:01 CDT      Peripheral Pulse Rate     105 bpm  HI                             Respiratory Rate          18 br/min                             Systolic Blood Pressure   103 mmHg                             Diastolic Blood Pressure  64 mmHg                             Mean Arterial Pressure, Cuff              77 mmHg    3/20/2022 14:55 CDT      Peripheral Pulse Rate     100 bpm                             Respiratory Rate          16 br/min                             Systolic Blood Pressure   103 mmHg                             Diastolic Blood Pressure  68 mmHg                             Mean Arterial Pressure, Cuff              80 mmHg    3/20/2022 14:25 CDT      Temperature Temporal Artery               36.0 DegC  LOW                             Peripheral Pulse Rate     102 bpm  HI                             Respiratory Rate          18 br/min                             Oxygen Therapy            Room air                              Systolic Blood Pressure   109 mmHg                             Diastolic Blood Pressure  75 mmHg                             Mean Arterial Pressure, Cuff              86 mmHg                             Blood Pressure Location   Left arm                             Blood Pressure Cuff Size  Adult long  .      Vital Signs (last 24 hrs)_____  Last Charted___________  Temp Oral     37.0 DegC  (MAR 21 11:51)  Heart Rate Peripheral   95 bpm  (MAR 21 11:51)  Resp Rate         16 br/min  (MAR 21 11:51)  SBP      109 mmHg  (MAR 21 11:51)  DBP      72 mmHg  (MAR 21 11:51)  .   Measurements   3/21/2022 11:56 CDT      Weight Dosing             51.2 kg                             Weight Measured and Calculated in Lbs     112.88 lb                             Weight Estimated          51.2 kg                             Height/Length Dosing      149.8 cm                             Height/Length Estimated   149.8 cm                             BSA Estimated             1.46 m2                             Body Mass Index Estimated 22.82 kg/m2    3/20/2022 14:25 CDT      Weight Estimated          51.2 kg                             Height/Length Dosing      150 cm  .   Basic Oxygen Information   3/20/2022 14:25 CDT      Oxygen Therapy            Room air  .   Genitourinary:  Vaginal status info ST   Cervix Dilation: 1.0 cm (03/21/22 12:20:00), Membrane status info ST   No qualifying data available.    General- Well, appears as stated age  Abdomen- Soft NT no rebound/guarding  No bilateral CVA tenderness or suprapubic tenderness  GYN- no urethral discharge, no vaginal or vulvar lesions, Uterus mobile non tender  Pelvic examSSE negative for pooling negative for Valsalva effort is then collected in the posterior fornix cervix appears to be approximately 1 cm visually dilated with membranes intact  SVE deferred  Lower extremitybilateral no edema or tenderness    Electronic fetal heart: Appropriate for gestational  age 89 moderate [id number] variability and occasional variable decelerations  Tocometer: Irritability and some irregular and infrequent contractions approximately 12-20 minutes apart      Medical Decision Making   Tests pending:  Prenatal care info ST   No qualifying data available.   Results review:     No qualifying data available.      Reexamination/ Reevaluation   Vital signs   Basic Oxygen Information   3/20/2022 14:25 CDT      Oxygen Therapy            Room air        Impression and Plan   25 weeks gestation  short cervix on PV progesterione  abdominal discomfort/contractions  threatened PTL    Admit to [facility], consult MFM  GBS swabs obtained  BMZ and procardia and CL/growth U/S ordered (FFN collected, will hold untile CL resulted and send PRN)  Mag neuroppx/PCN for GBS ppx PRN (if symptoms/ctx worsen) pt did say some improvement at time of exam  Labs, IV fluids, antiemetics, antacids PRN  SCD BLE  All questions answered

## 2022-05-21 NOTE — HISTORICAL [facility]ER
This is a historical note converted from Mariajose. Formatting and pictures may have been removed.  Please reference Mariajose for original formatting and attached multimedia. Chief Complaint  IUP @ 25.6wks- contractions q 10 min since [place]. Has been to hospital the past 2 days where she has received Brethine and Procardi. She called her doctor this morning and instructed to come back in to be evalauted  Reason for Consultation  IUP at 25w6d with history of shortened cervix and current c/o abd pain/cramping  History of Present Illness  The patient is a 20 y/o  woman at 25w6d. She have been following her in our [facility] clinic for shortened cervix incidentally discovered at routine anatomical ultrasound at her OBs office. She was placed on vaginal progesterone QHS. Her cervical length has been stable.  On 3/19, she began having multiple episodes of severe diarrhea and abd cramping. She presented to an ED for these complaints and she was treated for diarrhea and was given Brethine, per her report. She was provided hydration and ultimately discharged to home once symptoms improved. On 3/20, she presented to [facility] for c/o abd cramping and nausea. She was provided hydration and discharged once symptoms improved. Today, she presented to [facility], again, for abd cramping. She initially had a contraction pattern which has now dissipated with Nifedipine and IV hydration. She reports?no longer experiencing?nausea and diarrhea. She states the abd pain has gotten better since arrival. Denies LOF,?vaginal bleeding. Reports + fetal movement.?TVUS performed with cervical length <30mm.  Review of Systems  Constitutional:?no fever, fatigue, weakness, malaise  Eye:?no vision loss, visual disturbances, scotomas, diplopia, eye redness, drainage, or pain  ENMT:?no sore throat, ear pain, sinus pain/congestion, nasal congestion/drainage  Respiratory:?no cough, no wheezing, no shortness of breath  Cardiovascular:?no chest pain, no  palpitations,?no edema  Gastrointestinal:?no nausea, vomiting, or diarrhea. abdominal pain improving  Genitourinary:?no dysuria, no urinary frequency or urgency, no hematuria, no vaginal bleeding or foul discharge  Hema/Lymph:?no abnormal bruising or bleeding  Endocrine:?no heat or cold intolerance, no excessive thirst or excessive urination  Musculoskeletal:?no muscle or joint pain, no joint swelling  Integumentary:?no skin rash or abnormal lesion  Neurologic: no headache, no dizziness, no weakness or numbness  ?  Physical Exam  Vitals & Measurements  T:?37.0? ?C (Oral)? HR:?90(Monitored)? RR:?16? BP:?101/66? WT:?51.2?kg?  Event Name? Event Result? Date/Time?   Temperature Oral 37 DegC 03/21/22 13:59:00   Temperature Oral 37 DegC 03/21/22 11:51:00   Peripheral Pulse Rate 95 bpm 03/21/22 13:59:00   Peripheral Pulse Rate 95 bpm 03/21/22 11:51:00   Heart Rate Monitored 90 bpm 03/21/22 14:06:00   Respiratory Rate 16 br/min 03/21/22 13:59:00   Respiratory Rate 16 br/min 03/21/22 11:51:00   Systolic Blood Pressure 101 mmHg 03/21/22 14:06:00   Systolic Blood Pressure 109 mmHg 03/21/22 13:59:00   Systolic Blood Pressure 109 mmHg 03/21/22 11:51:00   Diastolic Blood Pressure 66 mmHg 03/21/22 14:06:00   Diastolic Blood Pressure 72 mmHg 03/21/22 13:59:00   Diastolic Blood Pressure 72 mmHg 03/21/22 11:51:00   Mean Arterial Pressure, Cuff 78 mmHg 03/21/22 14:06:00   Mean Arterial Pressure, Cuff 84 mmHg 03/21/22 13:59:00   Mean Arterial Pressure, Cuff 84 mmHg 03/21/22 11:51:00   ?  ?Labs Last 24 Hours?  ?Hematology/Coagulation?   WBC: 8.1 x10(3)/mcL (03/21/22 12:40:00)   RBC:?4.04 x10(6)/mcL?Low (03/21/22 12:40:00)   Hgb:?11.2 gm/dL?Low (03/21/22 12:40:00)   Hct:?35.6 %?Low (03/21/22 12:40:00)   Platelet: 205 x10(3)/mcL (03/21/22 12:40:00)   MCV: 88.1 fL (03/21/22 12:40:00)   MCH: 27.7 pg (03/21/22 12:40:00)   MCHC:?31.5 gm/dL?Low (03/21/22 12:40:00)   RDW: 13.2 % (03/21/22 12:40:00)   MPV: 11.2 fL (03/21/22 12:40:00)   Abs Neut:  5.92 x10(3)/mcL (03/21/22 12:40:00)   Neutro Auto: 73 % (03/21/22 12:40:00)   Lymph Auto: 18 % (03/21/22 12:40:00)   Mono Auto: 7 % (03/21/22 12:40:00)   Eos Auto: 1 % (03/21/22 12:40:00)   Abs Eos: 0 x10(3)/mcL (03/21/22 12:40:00)   Basophil Auto: 0 % (03/21/22 12:40:00)   Abs Neutro: 5.92 x10(3)/mcL (03/21/22 12:40:00)   Abs Lymph: 1.5 x10(3)/mcL (03/21/22 12:40:00)   Abs Mono: 0.6 x10(3)/mcL (03/21/22 12:40:00)   Abs Baso: 0 x10(3)/mcL (03/21/22 12:40:00)   ?UA completed  ?  General:?well-developed well-nourished in no acute distress  Eye: PERRLA, EOMI, clear conjunctiva, eyelids normal  HENT:?TMs/ear canals clear, oropharynx without erythema/exudate, oropharynx and nasal mucosal surfaces moist, no maxillary/frontal sinus tenderness to palpation  Neck: full range of motion, no thyromegaly or lymphadenopathy  Respiratory:?clear to auscultation bilaterally  Cardiovascular:?regular rate and rhythm without murmurs, gallops or rubs  Gastrointestinal:?gravid,?soft, non-tender, non-distended with normal bowel sounds, without masses to palpation  Genitourinary: no CVA tenderness to palpation  Musculoskeletal:?full range of motion of all extremities/spine without limitation or discomfort  Integumentary: no rashes or skin lesions present  Neurologic: cranial nerves intact, no signs of peripheral neurological deficit, motor/sensory function intact  ?  FHTs: 150s, moderate variability, + accels, occasional contractions  ?  Assessment/Plan  myers IUP at 25w6d, hx short cx with abd pain, S/P dehydration and probable viral gastroenteritis  ?  1) Since transvaginal cervical length >30mm, no need to run FFN. Can hold off on corticosteroids and neuromag at this time.?If delivery becomes imminent, can administer at that time.  2) Nifedipine PRN. Continue vaginal progesterone 200mg QHS?until 37 weeks.?  3) FHT stable. Continue to monitor contraction pattern. If Nifedipine effectively achieves uterine quiescence, may d/c to  home?w/ PRN Rx.  ?  The patients condition, diagnostic testing, and plan of care discussed with Dr Green, in full, who agrees with all.  ?  ?  ?  Thank you for allowing us to participate in the care of your patient. If you have any questions/concerns, please do not hesitate to contact us.   Problem List/Past Medical History  Ongoing  Asthma  Asthma  Pregnant  Historical  No qualifying data  Procedure/Surgical History  tonsils   Medications  Inpatient  Celestone, 12 mg= 2 mL, IM, q24hr  [id number] 1,000 mL, 1000 mL, IV  Nubain, 10 mg= 1 mL, IV Push, q2hr, PRN  Pepcid (for IV Push), 20 mg= 2 mL, Oral, BID  Procardia 10 mg oral capsule, 10 mg= 1 cap(s), Oral, q6hr, PRN  Toradol Oral Tab, 10 mg= 1 tab(s), Oral, Now  Tums 500, 500 mg= 1 tab(s), Chewed, TID, PRN  Zofran 2 mg/mL injectable solution, 2 mg= 1 mL, IV Push, q4hr, PRN  Home  acetaminophen/butalbital/caffeine 300 mg-50 mg-40 mg oral capsule, 1 cap(s), Oral, q6hr,? ?Not taking  Prenatal 1 Plus 1 (Pt. Own), Oral, Daily  progesterone 200 mg oral capsule, 200 mg, Oral, qPM  Allergies  No Known Allergies  Social History  Abuse/Neglect  No, No, Yes, 03/20/2022  Tobacco  Never (less than 100 in lifetime), N/A, 03/20/2022  Never smoker, 07/29/2016

## 2022-06-20 ENCOUNTER — HOSPITAL ENCOUNTER (INPATIENT)
Facility: HOSPITAL | Age: 22
LOS: 2 days | Discharge: HOME | End: 2022-06-23
Attending: SPECIALIST | Admitting: OBSTETRICS & GYNECOLOGY
Payer: COMMERCIAL

## 2022-06-20 DIAGNOSIS — Z34.90 PREGNANCY: ICD-10-CM

## 2022-06-20 PROCEDURE — 59025 FETAL NON-STRESS TEST: CPT

## 2022-06-20 PROCEDURE — 99285 EMERGENCY DEPT VISIT HI MDM: CPT | Mod: 25

## 2022-06-21 ENCOUNTER — ANESTHESIA (OUTPATIENT)
Dept: OBSTETRICS AND GYNECOLOGY | Facility: HOSPITAL | Age: 22
End: 2022-06-21
Payer: COMMERCIAL

## 2022-06-21 ENCOUNTER — ANESTHESIA EVENT (OUTPATIENT)
Dept: OBSTETRICS AND GYNECOLOGY | Facility: HOSPITAL | Age: 22
End: 2022-06-21
Payer: COMMERCIAL

## 2022-06-21 VITALS — RESPIRATION RATE: 16 BRPM

## 2022-06-21 LAB
BASOPHILS # BLD AUTO: 0.03 X10(3)/MCL (ref 0–0.2)
BASOPHILS NFR BLD AUTO: 0.3 %
EOSINOPHIL # BLD AUTO: 0.08 X10(3)/MCL (ref 0–0.9)
EOSINOPHIL NFR BLD AUTO: 0.8 %
ERYTHROCYTE [DISTWIDTH] IN BLOOD BY AUTOMATED COUNT: 13.2 % (ref 11.5–17)
GROUP & RH: NORMAL
HCT VFR BLD AUTO: 39.7 % (ref 37–47)
HGB BLD-MCNC: 12.1 GM/DL (ref 12–16)
IMM GRANULOCYTES # BLD AUTO: 0.08 X10(3)/MCL (ref 0–0.02)
IMM GRANULOCYTES NFR BLD AUTO: 0.8 % (ref 0–0.43)
INDIRECT COOMBS GEL: NORMAL
LYMPHOCYTES # BLD AUTO: 2.3 X10(3)/MCL (ref 0.6–4.6)
LYMPHOCYTES NFR BLD AUTO: 23.7 %
MCH RBC QN AUTO: 25.6 PG (ref 27–31)
MCHC RBC AUTO-ENTMCNC: 30.5 MG/DL (ref 33–36)
MCV RBC AUTO: 84.1 FL (ref 80–94)
MONOCYTES # BLD AUTO: 0.72 X10(3)/MCL (ref 0.1–1.3)
MONOCYTES NFR BLD AUTO: 7.4 %
NEUTROPHILS # BLD AUTO: 6.5 X10(3)/MCL (ref 2.1–9.2)
NEUTROPHILS NFR BLD AUTO: 67 %
NRBC BLD AUTO-RTO: 0 %
PLATELET # BLD AUTO: 200 X10(3)/MCL (ref 130–400)
PMV BLD AUTO: 12.7 FL (ref 9.4–12.4)
RBC # BLD AUTO: 4.72 X10(6)/MCL (ref 4.2–5.4)
T PALLIDUM AB SER QL: NONREACTIVE
T PALLIDUM AB SER QL: NORMAL
WBC # SPEC AUTO: 9.7 X10(3)/MCL (ref 4.5–11.5)

## 2022-06-21 PROCEDURE — 25000003 PHARM REV CODE 250: Performed by: OBSTETRICS & GYNECOLOGY

## 2022-06-21 PROCEDURE — 62326 NJX INTERLAMINAR LMBR/SAC: CPT | Performed by: REGISTERED NURSE

## 2022-06-21 PROCEDURE — 25000003 PHARM REV CODE 250: Performed by: REGISTERED NURSE

## 2022-06-21 PROCEDURE — 37000009 HC ANESTHESIA EA ADD 15 MINS

## 2022-06-21 PROCEDURE — 72100002 HC LABOR CARE, 1ST 8 HOURS

## 2022-06-21 PROCEDURE — 72200006 HC VAGINAL DELIVERY LEVEL III

## 2022-06-21 PROCEDURE — 63600175 PHARM REV CODE 636 W HCPCS

## 2022-06-21 PROCEDURE — 86901 BLOOD TYPING SEROLOGIC RH(D): CPT | Performed by: OBSTETRICS & GYNECOLOGY

## 2022-06-21 PROCEDURE — 37000008 HC ANESTHESIA 1ST 15 MINUTES

## 2022-06-21 PROCEDURE — 72100003 HC LABOR CARE, EA. ADDL. 8 HRS

## 2022-06-21 PROCEDURE — 86780 TREPONEMA PALLIDUM: CPT | Performed by: OBSTETRICS & GYNECOLOGY

## 2022-06-21 PROCEDURE — 63600175 PHARM REV CODE 636 W HCPCS: Performed by: OBSTETRICS & GYNECOLOGY

## 2022-06-21 PROCEDURE — 36415 COLL VENOUS BLD VENIPUNCTURE: CPT | Performed by: OBSTETRICS & GYNECOLOGY

## 2022-06-21 PROCEDURE — 25000003 PHARM REV CODE 250

## 2022-06-21 PROCEDURE — 11000001 HC ACUTE MED/SURG PRIVATE ROOM

## 2022-06-21 PROCEDURE — 63600175 PHARM REV CODE 636 W HCPCS: Performed by: NURSE ANESTHETIST, CERTIFIED REGISTERED

## 2022-06-21 PROCEDURE — 85025 COMPLETE CBC W/AUTO DIFF WBC: CPT | Performed by: OBSTETRICS & GYNECOLOGY

## 2022-06-21 RX ORDER — DEXTROSE, SODIUM CHLORIDE, SODIUM LACTATE, POTASSIUM CHLORIDE, AND CALCIUM CHLORIDE 5; .6; .31; .03; .02 G/100ML; G/100ML; G/100ML; G/100ML; G/100ML
INJECTION, SOLUTION INTRAVENOUS CONTINUOUS
Status: DISCONTINUED | OUTPATIENT
Start: 2022-06-21 | End: 2022-06-23

## 2022-06-21 RX ORDER — HYDROCODONE BITARTRATE AND ACETAMINOPHEN 10; 325 MG/1; MG/1
1 TABLET ORAL EVERY 4 HOURS PRN
Status: DISCONTINUED | OUTPATIENT
Start: 2022-06-21 | End: 2022-06-23 | Stop reason: HOSPADM

## 2022-06-21 RX ORDER — CARBOPROST TROMETHAMINE 250 UG/ML
250 INJECTION, SOLUTION INTRAMUSCULAR
Status: DISCONTINUED | OUTPATIENT
Start: 2022-06-21 | End: 2022-06-23

## 2022-06-21 RX ORDER — PROCHLORPERAZINE EDISYLATE 5 MG/ML
5 INJECTION INTRAMUSCULAR; INTRAVENOUS EVERY 6 HOURS PRN
Status: DISCONTINUED | OUTPATIENT
Start: 2022-06-21 | End: 2022-06-23

## 2022-06-21 RX ORDER — MISOPROSTOL 100 UG/1
800 TABLET ORAL
Status: DISCONTINUED | OUTPATIENT
Start: 2022-06-21 | End: 2022-06-23

## 2022-06-21 RX ORDER — ACETAMINOPHEN 325 MG/1
650 TABLET ORAL EVERY 6 HOURS PRN
Status: DISCONTINUED | OUTPATIENT
Start: 2022-06-21 | End: 2022-06-23 | Stop reason: HOSPADM

## 2022-06-21 RX ORDER — SIMETHICONE 80 MG
1 TABLET,CHEWABLE ORAL EVERY 6 HOURS PRN
Status: DISCONTINUED | OUTPATIENT
Start: 2022-06-21 | End: 2022-06-23 | Stop reason: HOSPADM

## 2022-06-21 RX ORDER — PROCHLORPERAZINE EDISYLATE 5 MG/ML
5 INJECTION INTRAMUSCULAR; INTRAVENOUS EVERY 6 HOURS PRN
Status: DISCONTINUED | OUTPATIENT
Start: 2022-06-21 | End: 2022-06-23 | Stop reason: HOSPADM

## 2022-06-21 RX ORDER — SODIUM CHLORIDE 0.9 % (FLUSH) 0.9 %
10 SYRINGE (ML) INJECTION
Status: DISCONTINUED | OUTPATIENT
Start: 2022-06-21 | End: 2022-06-23 | Stop reason: HOSPADM

## 2022-06-21 RX ORDER — SODIUM CITRATE AND CITRIC ACID MONOHYDRATE 334; 500 MG/5ML; MG/5ML
30 SOLUTION ORAL ONCE
Status: DISCONTINUED | OUTPATIENT
Start: 2022-06-21 | End: 2022-06-23

## 2022-06-21 RX ORDER — SODIUM CITRATE AND CITRIC ACID MONOHYDRATE 334; 500 MG/5ML; MG/5ML
30 SOLUTION ORAL ONCE
Status: CANCELLED | OUTPATIENT
Start: 2022-06-21 | End: 2022-06-21

## 2022-06-21 RX ORDER — DOCUSATE SODIUM 100 MG/1
200 CAPSULE, LIQUID FILLED ORAL 2 TIMES DAILY PRN
Status: DISCONTINUED | OUTPATIENT
Start: 2022-06-21 | End: 2022-06-23 | Stop reason: HOSPADM

## 2022-06-21 RX ORDER — FENTANYL/BUPIVACAINE/NS/PF 2-1250MCG
PLASTIC BAG, INJECTION (ML) INJECTION CONTINUOUS PRN
Status: DISCONTINUED | OUTPATIENT
Start: 2022-06-21 | End: 2022-06-21

## 2022-06-21 RX ORDER — DIPHENHYDRAMINE HCL 25 MG
25 CAPSULE ORAL EVERY 4 HOURS PRN
Status: DISCONTINUED | OUTPATIENT
Start: 2022-06-21 | End: 2022-06-23 | Stop reason: HOSPADM

## 2022-06-21 RX ORDER — FENTANYL/BUPIVACAINE/NS/PF 2-1250MCG
PLASTIC BAG, INJECTION (ML) INJECTION CONTINUOUS
Status: DISCONTINUED | OUTPATIENT
Start: 2022-06-21 | End: 2022-06-23

## 2022-06-21 RX ORDER — OXYTOCIN/RINGER'S LACTATE 30/500 ML
95 PLASTIC BAG, INJECTION (ML) INTRAVENOUS ONCE
Status: DISCONTINUED | OUTPATIENT
Start: 2022-06-21 | End: 2022-06-23 | Stop reason: HOSPADM

## 2022-06-21 RX ORDER — ONDANSETRON 4 MG/1
8 TABLET, ORALLY DISINTEGRATING ORAL EVERY 8 HOURS PRN
Status: DISCONTINUED | OUTPATIENT
Start: 2022-06-21 | End: 2022-06-23 | Stop reason: HOSPADM

## 2022-06-21 RX ORDER — IBUPROFEN 600 MG/1
600 TABLET ORAL EVERY 6 HOURS PRN
Status: DISCONTINUED | OUTPATIENT
Start: 2022-06-21 | End: 2022-06-23 | Stop reason: HOSPADM

## 2022-06-21 RX ORDER — DIPHENHYDRAMINE HYDROCHLORIDE 50 MG/ML
INJECTION INTRAMUSCULAR; INTRAVENOUS
Status: COMPLETED
Start: 2022-06-21 | End: 2022-06-21

## 2022-06-21 RX ORDER — HYDROCODONE BITARTRATE AND ACETAMINOPHEN 5; 325 MG/1; MG/1
1 TABLET ORAL EVERY 4 HOURS PRN
Status: DISCONTINUED | OUTPATIENT
Start: 2022-06-21 | End: 2022-06-23 | Stop reason: HOSPADM

## 2022-06-21 RX ORDER — ONDANSETRON 2 MG/ML
4 INJECTION INTRAMUSCULAR; INTRAVENOUS ONCE
Status: CANCELLED | OUTPATIENT
Start: 2022-06-21 | End: 2022-06-21

## 2022-06-21 RX ORDER — DIPHENHYDRAMINE HYDROCHLORIDE 50 MG/ML
12.5 INJECTION INTRAMUSCULAR; INTRAVENOUS ONCE
Status: COMPLETED | OUTPATIENT
Start: 2022-06-21 | End: 2022-06-21

## 2022-06-21 RX ORDER — DIPHENHYDRAMINE HYDROCHLORIDE 50 MG/ML
25 INJECTION INTRAMUSCULAR; INTRAVENOUS EVERY 4 HOURS PRN
Status: DISCONTINUED | OUTPATIENT
Start: 2022-06-21 | End: 2022-06-23 | Stop reason: HOSPADM

## 2022-06-21 RX ORDER — PENICILLIN G POTASSIUM 5000000 [IU]/1
INJECTION, POWDER, FOR SOLUTION INTRAMUSCULAR; INTRAVENOUS
Status: DISPENSED
Start: 2022-06-21 | End: 2022-06-21

## 2022-06-21 RX ORDER — OXYTOCIN/RINGER'S LACTATE 30/500 ML
0-30 PLASTIC BAG, INJECTION (ML) INTRAVENOUS CONTINUOUS
Status: DISCONTINUED | OUTPATIENT
Start: 2022-06-21 | End: 2022-06-23 | Stop reason: HOSPADM

## 2022-06-21 RX ORDER — FENTANYL CITRATE 50 UG/ML
INJECTION, SOLUTION INTRAMUSCULAR; INTRAVENOUS
Status: DISCONTINUED | OUTPATIENT
Start: 2022-06-21 | End: 2022-06-21

## 2022-06-21 RX ORDER — CALCIUM CARBONATE 200(500)MG
500 TABLET,CHEWABLE ORAL 3 TIMES DAILY PRN
Status: DISCONTINUED | OUTPATIENT
Start: 2022-06-21 | End: 2022-06-23

## 2022-06-21 RX ORDER — HYDROCORTISONE 25 MG/G
CREAM TOPICAL 3 TIMES DAILY PRN
Status: DISCONTINUED | OUTPATIENT
Start: 2022-06-21 | End: 2022-06-23 | Stop reason: HOSPADM

## 2022-06-21 RX ORDER — PENICILLIN G 3000000 [IU]/50ML
3 INJECTION, SOLUTION INTRAVENOUS
Status: DISCONTINUED | OUTPATIENT
Start: 2022-06-21 | End: 2022-06-23

## 2022-06-21 RX ORDER — EPHEDRINE SULFATE 50 MG/ML
10 INJECTION, SOLUTION INTRAVENOUS EVERY 10 MIN PRN
Status: DISCONTINUED | OUTPATIENT
Start: 2022-06-21 | End: 2022-06-23

## 2022-06-21 RX ORDER — EPHEDRINE SULFATE 50 MG/ML
10 INJECTION, SOLUTION INTRAVENOUS ONCE AS NEEDED
Status: CANCELLED | OUTPATIENT
Start: 2022-06-21 | End: 2033-11-16

## 2022-06-21 RX ORDER — DIPHENHYDRAMINE HYDROCHLORIDE 50 MG/ML
12.5 INJECTION INTRAMUSCULAR; INTRAVENOUS EVERY 4 HOURS PRN
Status: CANCELLED | OUTPATIENT
Start: 2022-06-21

## 2022-06-21 RX ORDER — OXYTOCIN/RINGER'S LACTATE 30/500 ML
95 PLASTIC BAG, INJECTION (ML) INTRAVENOUS ONCE
Status: COMPLETED | OUTPATIENT
Start: 2022-06-21 | End: 2022-06-21

## 2022-06-21 RX ORDER — SIMETHICONE 80 MG
1 TABLET,CHEWABLE ORAL 4 TIMES DAILY PRN
Status: DISCONTINUED | OUTPATIENT
Start: 2022-06-21 | End: 2022-06-21

## 2022-06-21 RX ORDER — OXYTOCIN/RINGER'S LACTATE 30/500 ML
334 PLASTIC BAG, INJECTION (ML) INTRAVENOUS ONCE
Status: COMPLETED | OUTPATIENT
Start: 2022-06-21 | End: 2022-06-21

## 2022-06-21 RX ORDER — METHYLERGONOVINE MALEATE 0.2 MG/ML
200 INJECTION INTRAVENOUS
Status: DISCONTINUED | OUTPATIENT
Start: 2022-06-21 | End: 2022-06-23

## 2022-06-21 RX ORDER — PRENATAL WITH FERROUS FUM AND FOLIC ACID 3080; 920; 120; 400; 22; 1.84; 3; 20; 10; 1; 12; 200; 27; 25; 2 [IU]/1; [IU]/1; MG/1; [IU]/1; MG/1; MG/1; MG/1; MG/1; MG/1; MG/1; UG/1; MG/1; MG/1; MG/1; MG/1
1 TABLET ORAL DAILY
Status: DISCONTINUED | OUTPATIENT
Start: 2022-06-22 | End: 2022-06-23 | Stop reason: HOSPADM

## 2022-06-21 RX ORDER — BUPIVACAINE HYDROCHLORIDE 2.5 MG/ML
INJECTION, SOLUTION INFILTRATION; PERINEURAL
Status: DISCONTINUED | OUTPATIENT
Start: 2022-06-21 | End: 2022-06-21

## 2022-06-21 RX ORDER — ONDANSETRON 4 MG/1
8 TABLET, ORALLY DISINTEGRATING ORAL EVERY 8 HOURS PRN
Status: DISCONTINUED | OUTPATIENT
Start: 2022-06-21 | End: 2022-06-23

## 2022-06-21 RX ORDER — DIPHENOXYLATE HYDROCHLORIDE AND ATROPINE SULFATE 2.5; .025 MG/1; MG/1
1 TABLET ORAL 4 TIMES DAILY PRN
Status: DISCONTINUED | OUTPATIENT
Start: 2022-06-21 | End: 2022-06-23

## 2022-06-21 RX ORDER — IBUPROFEN 600 MG/1
600 TABLET ORAL EVERY 6 HOURS
Status: DISCONTINUED | OUTPATIENT
Start: 2022-06-21 | End: 2022-06-23 | Stop reason: HOSPADM

## 2022-06-21 RX ADMIN — SODIUM CHLORIDE, POTASSIUM CHLORIDE, SODIUM LACTATE AND CALCIUM CHLORIDE 500 ML: 600; 310; 30; 20 INJECTION, SOLUTION INTRAVENOUS at 01:06

## 2022-06-21 RX ADMIN — Medication 12 ML/HR: at 02:06

## 2022-06-21 RX ADMIN — Medication 334 MILLI-UNITS/MIN: at 03:06

## 2022-06-21 RX ADMIN — FENTANYL CITRATE 100 MCG: 50 INJECTION, SOLUTION INTRAMUSCULAR; INTRAVENOUS at 09:06

## 2022-06-21 RX ADMIN — SODIUM CHLORIDE, SODIUM LACTATE, POTASSIUM CHLORIDE, CALCIUM CHLORIDE AND DEXTROSE MONOHYDRATE: 5; 600; 310; 30; 20 INJECTION, SOLUTION INTRAVENOUS at 01:06

## 2022-06-21 RX ADMIN — BUPIVACAINE HYDROCHLORIDE 5 ML: 2.5 INJECTION, SOLUTION INFILTRATION; PERINEURAL at 09:06

## 2022-06-21 RX ADMIN — Medication 95 MILLI-UNITS/MIN: at 04:06

## 2022-06-21 RX ADMIN — DIPHENHYDRAMINE HYDROCHLORIDE 12.5 MG: 50 INJECTION, SOLUTION INTRAMUSCULAR; INTRAVENOUS at 12:06

## 2022-06-21 RX ADMIN — ONDANSETRON 8 MG: 4 TABLET, ORALLY DISINTEGRATING ORAL at 05:06

## 2022-06-21 RX ADMIN — SODIUM CHLORIDE, SODIUM LACTATE, POTASSIUM CHLORIDE, CALCIUM CHLORIDE AND DEXTROSE MONOHYDRATE: 5; 600; 310; 30; 20 INJECTION, SOLUTION INTRAVENOUS at 12:06

## 2022-06-21 RX ADMIN — DEXTROSE MONOHYDRATE 5 MILLION UNITS: 5 INJECTION INTRAVENOUS at 01:06

## 2022-06-21 RX ADMIN — IBUPROFEN 600 MG: 600 TABLET, FILM COATED ORAL at 05:06

## 2022-06-21 RX ADMIN — PENICILLIN G 3 MILLION UNITS: 3000000 INJECTION, SOLUTION INTRAVENOUS at 12:06

## 2022-06-21 RX ADMIN — PENICILLIN G 3 MILLION UNITS: 3000000 INJECTION, SOLUTION INTRAVENOUS at 07:06

## 2022-06-21 RX ADMIN — DIPHENHYDRAMINE HYDROCHLORIDE 12.5 MG: 50 INJECTION INTRAMUSCULAR; INTRAVENOUS at 12:06

## 2022-06-21 RX ADMIN — Medication 10 ML/HR: at 09:06

## 2022-06-21 NOTE — ANESTHESIA PREPROCEDURE EVALUATION
06/21/2022  Mya Ayoub is a 21 y.o., female.      Pre-op Assessment    I have reviewed the Patient Summary Reports.     I have reviewed the Nursing Notes. I have reviewed the NPO Status.   I have reviewed the Medications.     Review of Systems  Anesthesia Hx:  No problems with previous Anesthesia    Hematology/Oncology:  Hematology Normal   Oncology Normal     EENT/Dental:EENT/Dental Normal   Cardiovascular:  Cardiovascular Normal Exercise tolerance: good     Pulmonary:   Asthma mild    Renal/:  Renal/ Normal     Hepatic/GI:  Hepatic/GI Normal    Musculoskeletal:  Musculoskeletal Normal    Neurological:  Neurology Normal    Endocrine:  Endocrine Normal    Dermatological:  Skin Normal    Psych:  Psychiatric Normal           Physical Exam  General: Well nourished, Cooperative, Alert and Oriented    Airway:  Mallampati: II   Mouth Opening: Normal  TM Distance: Normal  Tongue: Normal  Neck ROM: Normal ROM    Dental:  Intact    Chest/Lungs:  Clear to auscultation, Normal Respiratory Rate    Heart:  Rate: Normal  Rhythm: Regular Rhythm  Sounds: Normal    Abdomen:  Normal, Soft, Nontender        Anesthesia Plan  Type of Anesthesia, risks & benefits discussed:    Anesthesia Type: Epidural  Intra-op Monitoring Plan: Standard ASA Monitors  Post Op Pain Control Plan: epidural analgesia  Informed Consent: Informed consent signed with the Patient and all parties understand the risks and agree with anesthesia plan.  All questions answered. Patient consented to blood products? Yes  ASA Score: 2  Day of Surgery Review of History & Physical: H&P Update referred to the surgeon/provider.I have interviewed and examined the patient. I have reviewed the patient's H&P dated: There are no significant changes.     Ready For Surgery From Anesthesia Perspective.     .

## 2022-06-21 NOTE — PROGRESS NOTES
06/21/22 1455   [facility]ra Note - Strip   Strip Reviewed by Tamra Nurse? Yes   [facility] Tamra Note - Communication   [place] Nurse Communicated with Bedside Nurse Regarding: Fetal Status  (POC)   [facility]ra Note - Notification   Nurse Notified? Yes   Name of Nurse Phuong   Doctor Notified? No   States close to delivery

## 2022-06-21 NOTE — ANESTHESIA PROCEDURE NOTES
Epidural    Patient location during procedure: OB   Reason for block: primary anesthetic   Reason for block: labor analgesia requested by patient and obstetrician  Diagnosis: Labor Pain   Start time: 6/21/2022 1:45 AM  Timeout: 6/21/2022 1:45 AM  End time: 6/21/2022 2:05 AM  Surgery related to: Term Pregnancy    Staffing  Performing Provider: Jeffry Alonzo CRNA  Authorizing Provider: Jeffry Alonzo CRNA        Preanesthetic Checklist  Completed: patient identified, IV checked, site marked, risks and benefits discussed, surgical consent, monitors and equipment checked, pre-op evaluation, timeout performed, anesthesia consent given, hand hygiene performed and patient being monitored  Preparation  Patient position: sitting  Prep: ChloraPrep  Patient monitoring: Pulse Ox and Blood Pressure  Reason for block: primary anesthetic   Epidural  Skin Anesthetic: lidocaine 1%  Skin Wheal: 3 mL  Administration type: continuous  Approach: midline  Interspace: L3-4    Injection technique: BHAVANA saline  Needle and Epidural Catheter  Needle type: Tuohy   Needle gauge: 17  Needle length: 7.0 inches  Needle insertion depth: 5 cm  Catheter type: multi-orifice  Catheter size: 18 G  Catheter at skin depth: 12 cm  Insertion Attempts: 1  Test dose: 3 mL of lidocaine 1.5% with Epi 1-to-200,000  Additional Documentation: incremental injection, no paresthesia on injection, no significant pain on injection, negative aspiration for heme and CSF and no significant complaints from patient  Needle localization: anatomical landmarks  Assessment  Upper dermatomal levels - Left: T10  Right: T10   Dermatomal levels determined by alcohol wipe  Ease of block: easy  Patient's tolerance of the procedure: comfortable throughout block and no complaints No inadvertent dural puncture with Tuohy.  Dural puncture not performed with spinal needle

## 2022-06-21 NOTE — L&D DELIVERY NOTE
[facility] - Labor and Delivery  OBGYN  Operative Note    SUMMARY     Date of Procedure: 2022    Procedure: Spontaneous Vaginal Delivery    Surgeon: Lalitha Sales    Post-Operative Diagnosis:   1. s/p  at 39w0d   2. 2nd degree perineal laceration with repair      Anesthesia: epidural    Procedure in Detail:    Patient became c/c/+2 and began pushing with RN at 1333, deceleration noted after maternal effort for 2 mins with good return to baseline. Arrive at patient room at 14:10. With maternal effort some descent noted but vaginal and vulvar edema noted. Warm compress applied.  Around 14:45 a 4 min deceration noted resolved with change in maternal position and O2, asked for OR to be opened.  baby recovered. With maternal effort fetal head delivered at 15:08 OP presentation.    Nuchal cord was present. Remainder of infant delivered without difficulty. Cord clamped x 2 and transected.  Baby transfer to [facility] where NICU personal was present. Attempt to resuscitate baby require intubation.  The placenta then delivered spontaneously, and was noted to be intact. The vagina, perineum, and cervix were examined. Lacerations were present. After any necessary repairs were performed, all instruments and sponges were removed from the vagina. Sponge, lap, and needle counts were correct after the procedure.    NICU team at bedside reviewing plan with patient who understandably upset    Repair Suture: 2.0 chromic    Infant: Liveborn male infant with APGARS per NICU 3 vessel umbilical cord.    Estimated Blood Loss (EBL): 400 cc           Condition: Mother stableBaby transferred to NICU in Stable condition.     Will update patient

## 2022-06-21 NOTE — H&P
HISTORY AND PHYSICAL                                                OBSTETRICS          Subjective:      Mya Ayoub is a 21 y.o.  female with IUP at 39w0d weeks gestation who presents to L&D for contractions increasing in frequency and intensity. Currently comfortable with epidural She denies vaginal bleeding, leakage of fluid and decreased fetal movement.  Care this pregnancy has been with Dr. Tucker. Prenatal care Uncomplicated    PMHx:   Past Medical History:   Diagnosis Date    Asthma        PSHx:   Past Surgical History:   Procedure Laterality Date    INDUCED       tonsilectomy      TONSILLECTOMY, ADENOIDECTOMY         All: Review of patient's allergies indicates:  No Known Allergies    Meds:   Medications Prior to Admission   Medication Sig Dispense Refill Last Dose    prenatal vit no.124/iron/folic (PRENATAL VITAMIN ORAL) Take by mouth.   2022    butalbital-acetaminophen-caff -40 mg Cap Take 1 capsule by mouth every 6 (six) hours as needed.   More than a month    NIFEdipine (PROCARDIA) 10 MG Cap Take 10 mg by mouth every 6 (six) hours as needed.   More than a month    progesterone (PROMETRIUM) 200 MG capsule Place 200 mg vaginally nightly.   More than a month       SH:   Social History     Socioeconomic History    Marital status: Single   Occupational History    Occupation: nurse   Tobacco Use    Smoking status: Never Smoker    Smokeless tobacco: Never Used   Substance and Sexual Activity    Alcohol use: Not Currently    Drug use: Never    Sexual activity: Yes       FH:   Family History   Problem Relation Age of Onset    COPD Paternal Grandmother     Hypertension Maternal Grandmother     COPD Maternal Grandmother     Hypertension Maternal Grandfather        OBHx:   OB History    Para Term  AB Living   2 0 0 0 1 0   SAB IAB Ectopic Multiple Live Births   0 1 0 0 0      # Outcome Date GA Lbr Haim/2nd Weight Sex Delivery Anes PTL Lv   2 Current        "     1 IAB 20 6w0d              Objective:      /66   Pulse 95   Temp 97.9 °F (36.6 °C)   Resp 18   Ht 4' 11" (1.499 m)   Wt 54.4 kg (120 lb)   SpO2 98%   Breastfeeding No   BMI 24.24 kg/m²   Body mass index is 24.24 kg/m².    General:   alert and cooperative   HEENT:  normocephalic, atraumatic   Lungs:   clear to auscultation bilaterally   Heart:   regular rate and rhythm, S1, S2 normal   Abdomen:  gravid, non-tender   Extremities non-tender, no edema   Derm: no rashes or lesions   Psych: appropriate mood and affect   Pelvis:  adequate       FHT: Category: 1                 TOCO: Contractions: regular, every 1-2 minutes       Cervix:     Dilation: 6 cm    Effacement: 90    Station:  -1    Consistency: soft    Position mid   AROM clear fluids    Lab Review  GBS: negative      Assessment:     21 y.o.  at 39w0d weeks gestation.  In labor    There are no hospital problems to display for this patient.         Plan:     1. Risks, benefits, alternatives and possible complications have been discussed in detail with the patient. All questions have been answered, and Ms. Ayoub has voiced understanding and agrees to the treatment plan.  2. Consents signed and in chart  3. Admit to Labor and Delivery unit  4. S/p epidural  5. GBS posotive       "

## 2022-06-21 NOTE — PROGRESS NOTES
Patient seen   In room s/p Voiding  Baby seen in NICU  Stable Being extubated soon  Gases wnl did not required cooling  Family Updated  In good spirits.     H and P addendum to Include GBS positive    Patient received 3 dosis.

## 2022-06-21 NOTE — PROGRESS NOTES
06/21/22 1407   TeleSt[facility] Tamra Note - Strip   Strip Reviewed by Tamra Nurse? Yes   TeleStork Tamra Note - Communication   [place] Nurse Communicated with Bedside Nurse Regarding: Fetal Status  (loss of variability betw ctx)   TeleStork Tamra Note - Notification   Nurse Notified? Yes   Name of Nurse Debo   Doctor Notified? No   MD is reviewing strip

## 2022-06-21 NOTE — ED PROVIDER NOTES
"       [facility] NOTE     Admit Date: 2022  [facility] Physician: Gee Nair  Primary OBGYN: Dr. Tucker    Admit Diagnosis/Chief Complaint: Contractions    Chief Complaint   Patient presents with    Abdominal Pain       Hospital Course:  Mya Ayoub is a 21 y.o.  at 39w0d presents complaining of regular contractions.  Patient reports she was candice most of the day however the contractions became more consistent approximately 8:00 p.m. day prior to admission.  She reports the contractions now every 2-4 minutes and of greater intensity and discomfort.    This IUP is complicated by no complications at this time.  Patient reports she was evaluated in the physician's office today and was 4 cm. .    Patient denies vaginal bleeding, leakage of fluid, vision changes, RUQ pain and dysuria.  Fetal Movement: normal.    Review of Systems    /84 (BP Location: Left arm, Patient Position: Lying)   Pulse 103   Temp 97.9 °F (36.6 °C) (Oral)   Resp 18   Ht 4' 11" (1.499 m)   Wt 54.4 kg (120 lb)   SpO2 99%   Breastfeeding No   BMI 24.24 kg/m²   Temp:  [97.9 °F (36.6 °C)] 97.9 °F (36.6 °C)  Pulse:  [103-112] 103  Resp:  [18] 18  SpO2:  [99 %] 99 %  BP: (133)/(84) 133/84    General: alert oriented times 3 with mild to moderate discomfort.  Cardiovascular: regular rate and rhythm no murmurs  Respiratory: clear to auscultation, no wheezes, rales or rhonchi, symmetric air entry  Abdominal: fundus soft, nontender 38 weeks size FHT present  Back: lumbar tenderness present CVA tenderness none  Extremeties no redness or tenderness in the calves or thighs    SSE:   SVE:  4-5 cm, 80% effaced, -2 station, membranes intact.       FHT: Category 1  TOCO:  Uterine contractions 2-5 minutes apart.    Medical Decision Making:  New the patient was discussed with the on-call physician who wished to have the patient admitted with routine labor and delivery orders.    LABS:   No results found for this or any previous visit (from the " past 24 hour(s)).  [unfilled]     Imaging Results    None          ASSESMENT: Mya Ayoub is a 21 y.o.   at 39w0d with regular uterine contractions in early labor.    Discharge Diagnosis:  Pregnancy 39 weeks.  Uterine inertia.    Status:Stable    Disposition:  admitted to primary OB service    Medications:  Patient is group B positive and will receive penicillin    Patient Instructions:   Current Discharge Medication List      CONTINUE these medications which have NOT CHANGED    Details   prenatal vit no.124/iron/folic (PRENATAL VITAMIN ORAL) Take by mouth.      butalbital-acetaminophen-caff -40 mg Cap Take 1 capsule by mouth every 6 (six) hours as needed.      NIFEdipine (PROCARDIA) 10 MG Cap Take 10 mg by mouth every 6 (six) hours as needed.      progesterone (PROMETRIUM) 200 MG capsule Place 200 mg vaginally nightly.             - patient will be admitted to primary OB service.  No discharge procedures on file.    Gee Nair MD  OB-GYN Hospitalist       Gee Nair MD  22 [phone]

## 2022-06-22 PROCEDURE — 25000003 PHARM REV CODE 250: Performed by: OBSTETRICS & GYNECOLOGY

## 2022-06-22 PROCEDURE — 99465 NB RESUSCITATION: CPT

## 2022-06-22 PROCEDURE — 11000001 HC ACUTE MED/SURG PRIVATE ROOM

## 2022-06-22 RX ADMIN — IBUPROFEN 600 MG: 600 TABLET, FILM COATED ORAL at 05:06

## 2022-06-22 RX ADMIN — HYDROCODONE BITARTRATE AND ACETAMINOPHEN 1 TABLET: 5; 325 TABLET ORAL at 10:06

## 2022-06-22 RX ADMIN — IBUPROFEN 600 MG: 600 TABLET, FILM COATED ORAL at 12:06

## 2022-06-22 RX ADMIN — HYDROCODONE BITARTRATE AND ACETAMINOPHEN 1 TABLET: 5; 325 TABLET ORAL at 06:06

## 2022-06-22 RX ADMIN — IBUPROFEN 600 MG: 600 TABLET, FILM COATED ORAL at 11:06

## 2022-06-22 RX ADMIN — PRENATAL VITAMINS-IRON FUMARATE 27 MG IRON-FOLIC ACID 0.8 MG TABLET 1 TABLET: at 12:06

## 2022-06-22 NOTE — ANESTHESIA POSTPROCEDURE EVALUATION
Anesthesia Post Evaluation    Patient: Mya Ayoub    Procedure(s) Performed: * No procedures listed *    Final Anesthesia Type: epidural      Patient location during evaluation: floor  Patient participation: Yes- Able to Participate  Level of consciousness: awake and alert  Post-procedure vital signs: reviewed and stable  Pain management: adequate  Airway patency: patent    PONV status at discharge: vomiting (controlled) and nausea (controlled)  Anesthetic complications: no      Cardiovascular status: hemodynamically stable  Respiratory status: spontaneous ventilation  Hydration status: euvolemic  Follow-up not needed.  Comments:   Post op visit on Mother Baby following ALEXANDREA or SAB for Obstetrical Anesthesia/Analgesia.  Block resolved.Pt ambulating. No complaints of headache.          Vitals Value Taken Time   BP 97/61 06/22/22 0719   Temp 36.7 °C (98.1 °F) 06/22/22 0719   Pulse 85 06/22/22 0719   Resp 20 06/22/22 1027   SpO2 97 % 06/21/22 1637   Vitals shown include unvalidated device data.      No case tracking events are documented in the log.      Pain/Nelson Score: Pain Rating Prior to Med Admin: 2 (6/22/2022 12:29 PM)

## 2022-06-22 NOTE — PLAN OF CARE
Problem:  Fall Injury Risk  Goal: Absence of Fall, Infant Drop and Related Injury  Outcome: Ongoing, Progressing  Intervention: Promote Injury-Free Environment  Flowsheets (Taken 2022)  Safety Promotion/Fall Prevention:   assistive device/personal item within reach   nonskid shoes/socks when out of bed   side rails raised x 2     Problem: Infection  Goal: Absence of Infection Signs and Symptoms  Outcome: Ongoing, Progressing     Problem: Adult Inpatient Plan of Care  Goal: Plan of Care Review  Outcome: Ongoing, Progressing  Flowsheets (Taken 2022)  Plan of Care Reviewed With:   patient   significant other  Goal: Patient-Specific Goal (Individualized)  Description: I want to get some rest  Outcome: Ongoing, Progressing  Flowsheets (Taken 2022)  Patient-Specific Goals (Include Timeframe): I want to get some rest  Goal: Absence of Hospital-Acquired Illness or Injury  Outcome: Ongoing, Progressing  Intervention: Identify and Manage Fall Risk  Flowsheets (Taken 2022)  Safety Promotion/Fall Prevention:   assistive device/personal item within reach   nonskid shoes/socks when out of bed   side rails raised x 2  Intervention: Prevent and Manage VTE (Venous Thromboembolism) Risk  Flowsheets (Taken 2022)  VTE Prevention/Management: ambulation promoted  Range of Motion: ROM (range of motion) performed  Intervention: Prevent Infection  Flowsheets (Taken 2022)  Infection Prevention:   hand hygiene promoted   rest/sleep promoted  Goal: Optimal Comfort and Wellbeing  Outcome: Ongoing, Progressing  Intervention: Monitor Pain and Promote Comfort  Flowsheets (Taken 2022)  Pain Management Interventions: pain management plan reviewed with patient/caregiver  Intervention: Provide Person-Centered Care  Flowsheets (Taken 2022)  Trust Relationship/Rapport:   care explained   questions answered   questions encouraged  Goal: Readiness for Transition of  Care  Outcome: Ongoing, Progressing     Problem: Adjustment to Role Transition (Postpartum Vaginal Delivery)  Goal: Successful Maternal Role Transition  Outcome: Ongoing, Progressing     Problem: Bleeding (Postpartum Vaginal Delivery)  Goal: Hemostasis  Outcome: Ongoing, Progressing  Intervention: Monitor and Manage Postpartum Bleeding  Flowsheets (Taken 2022)   Bleed Management: uterine massage provided (delivered)     Problem: Infection (Postpartum Vaginal Delivery)  Goal: Absence of Infection Signs/Symptoms  Outcome: Ongoing, Progressing     Problem: Pain (Postpartum Vaginal Delivery)  Goal: Acceptable Pain Control  Outcome: Ongoing, Progressing  Intervention: Prevent or Manage Pain  Flowsheets (Taken 2022)  Pain Management Interventions: pain management plan reviewed with patient/caregiver     Problem: Urinary Retention (Postpartum Vaginal Delivery)  Goal: Effective Urinary Elimination  Outcome: Ongoing, Progressing

## 2022-06-22 NOTE — PROGRESS NOTES
PostPartum Progress Note        Subjective:      Post-Partum Day #1 after uncomplicated vaginal delivery.    Patient is without complaints. Lochia less than menses. Both breast and bottle feeding. Pain is well controlled. Patient is ambulating. Tolerating Full Regular diet.Overall mother and baby are doing well.     Objective:      Temp:  [97.7 °F (36.5 °C)-99.5 °F (37.5 °C)] 98.1 °F (36.7 °C)  Pulse:  [] 85  Resp:  [18-20] 20  SpO2:  [95 %-100 %] 98 %  BP: ()/(55-83) 97/61    Intake/Output Summary (Last 24 hours) at 2022 1158  Last data filed at 2022 1849  Gross per 24 hour   Intake --   Output 1305 ml   Net -1305 ml     Body mass index is 24.24 kg/m².    General: no acute distress  Abdomen: soft, non-tender, non-distended; Fundus firm and below the umbilicus  Episiotomy site- healing well, no sign of infection or separation  Extremities: non-tender, symmetric, trace edema    Group & Rh   Date Value Ref Range Status   2022 O POS  Final     Recent Results (from the past 336 hour(s))   CBC with Differential    Collection Time: 22 12:51 AM   Result Value Ref Range    WBC 9.7 4.5 - 11.5 x10(3)/mcL    Hgb 12.1 12.0 - 16.0 gm/dL    Hct 39.7 37.0 - 47.0 %    Platelet 200 130 - 400 x10(3)/mcL          Assessment:     21 y.o.  S/P  Post-Partum Day #1  - Doing Well      Plan:     1. Continue routine postpartum care  2. Plan for D/C in AM

## 2022-06-23 VITALS
TEMPERATURE: 98 F | HEART RATE: 87 BPM | DIASTOLIC BLOOD PRESSURE: 54 MMHG | WEIGHT: 120 LBS | OXYGEN SATURATION: 98 % | HEIGHT: 59 IN | BODY MASS INDEX: 24.19 KG/M2 | RESPIRATION RATE: 16 BRPM | SYSTOLIC BLOOD PRESSURE: 90 MMHG

## 2022-06-23 PROBLEM — O43.199 MARGINAL INSERTION OF UMBILICAL CORD AFFECTING MANAGEMENT OF MOTHER: Status: RESOLVED | Noted: 2022-02-10 | Resolved: 2022-06-23

## 2022-06-23 PROBLEM — O28.3 ECHOGENIC INTRACARDIAC FOCUS OF FETUS ON PRENATAL ULTRASOUND: Status: RESOLVED | Noted: 2022-02-08 | Resolved: 2022-06-23

## 2022-06-23 PROBLEM — O26.873 CERVICAL SHORTENING AFFECTING PREGNANCY IN THIRD TRIMESTER: Status: RESOLVED | Noted: 2022-02-08 | Resolved: 2022-06-23

## 2022-06-23 PROCEDURE — 25000003 PHARM REV CODE 250: Performed by: OBSTETRICS & GYNECOLOGY

## 2022-06-23 RX ORDER — ACETAMINOPHEN 325 MG/1
650 TABLET ORAL EVERY 6 HOURS PRN
Qty: 30 TABLET | Refills: 0
Start: 2022-06-23

## 2022-06-23 RX ORDER — IBUPROFEN 600 MG/1
600 TABLET ORAL EVERY 6 HOURS PRN
Qty: 30 TABLET | Refills: 0 | Status: SHIPPED | OUTPATIENT
Start: 2022-06-23

## 2022-06-23 RX ORDER — PRENATAL WITH FERROUS FUM AND FOLIC ACID 3080; 920; 120; 400; 22; 1.84; 3; 20; 10; 1; 12; 200; 27; 25; 2 [IU]/1; [IU]/1; MG/1; [IU]/1; MG/1; MG/1; MG/1; MG/1; MG/1; MG/1; UG/1; MG/1; MG/1; MG/1; MG/1
1 TABLET ORAL DAILY
Qty: 30 TABLET | Refills: 11 | Status: SHIPPED | OUTPATIENT
Start: 2022-06-23 | End: 2023-06-23

## 2022-06-23 RX ADMIN — HYDROCODONE BITARTRATE AND ACETAMINOPHEN 1 TABLET: 10; 325 TABLET ORAL at 09:06

## 2022-06-23 RX ADMIN — IBUPROFEN 600 MG: 600 TABLET, FILM COATED ORAL at 11:06

## 2022-06-23 RX ADMIN — PRENATAL VITAMINS-IRON FUMARATE 27 MG IRON-FOLIC ACID 0.8 MG TABLET 1 TABLET: at 09:06

## 2022-06-23 RX ADMIN — IBUPROFEN 600 MG: 600 TABLET, FILM COATED ORAL at 06:06

## 2022-06-23 NOTE — PLAN OF CARE
Problem:  Fall Injury Risk  Goal: Absence of Fall, Infant Drop and Related Injury  Outcome: Met     Problem: Infection  Goal: Absence of Infection Signs and Symptoms  Outcome: Met     Problem: Adult Inpatient Plan of Care  Goal: Plan of Care Review  Outcome: Met  Goal: Patient-Specific Goal (Individualized)  Description: I want to get some rest  Outcome: Met  Goal: Absence of Hospital-Acquired Illness or Injury  Outcome: Met  Goal: Optimal Comfort and Wellbeing  Outcome: Met  Goal: Readiness for Transition of Care  Outcome: Met     Problem: Adjustment to Role Transition (Postpartum Vaginal Delivery)  Goal: Successful Maternal Role Transition  Outcome: Met     Problem: Bleeding (Postpartum Vaginal Delivery)  Goal: Hemostasis  Outcome: Met     Problem: Infection (Postpartum Vaginal Delivery)  Goal: Absence of Infection Signs/Symptoms  Outcome: Met     Problem: Pain (Postpartum Vaginal Delivery)  Goal: Acceptable Pain Control  Outcome: Met     Problem: Urinary Retention (Postpartum Vaginal Delivery)  Goal: Effective Urinary Elimination  Outcome: Met

## 2022-06-23 NOTE — PROGRESS NOTES
Copied from baby's chart:    .. Admit Assessment    Patient Identification  Boy Mya Ayoub   :  2024  Admit Date:  2022  Attending Provider:  Dennys Cervantes MD              Referral:   Pt was admitted to NICU with a diagnosis of Respiratory distress of , and was admitted this hospital stay due to Respiratory distress of  [P22.9].   is involved was referred to the Social Work Department via Routine NICU consult.    I      Living Situation:      Resides at [address]  [facility] [address] [address] [facility] [address], phone: [phone] (home).         Met with mother in her postpartum room for NICU admission assessment. Baby's Name: Jay Vasquez. FOB: Hemanth Vasquez (involved). Mother reports to living at above address with her mother. OB: Dr. Tucker. Pedi: Dr. Jacobs. Mother denied being on WIc throughout pregnancy but reports to having an initial WIC appt coming up, mother plans to breast feed. Mother reported to having all necessary baby supplies including a carseat and bassinet, discussed safe sleep and provided mother with safe sleep education resources as well as CM contact info and parent resource packet.    History/Current Symptoms of Anxiety/Depression: denied  Discussed PPD and identifying symptoms and provided mom with PPD counseling resources and symptom brochure.       Identified Support: FOB, mother, family      History/Current Substance Use: none indicated     Indications of Abuse/Neglect:  none indicated        Emotional/Behavioral/Cognitive Issues: none indicated     Current RX Prescriptions: none    Adequate Discharge/Visiting Transportation: yes, FOB     TAWANNA Signed/Filed: n/a     Qualifies: n/a  Early steps: n/a  SSI: n/a     NICU Assessment completed in Flowsheets: yes     Plan: Will follow family throughout baby's stay in NICU for any needs.      Patient/caregiver engaged in treatment planning process.      providing  psychosocial and supportive counseling, resources, education, assistance and discharge planning as appropriate.  Patient/caregiver state understanding of  available resources,  following, remains available.        Patient/Caregiver informed of right to choose providers or agencies.  Patient/Caregiver provides permission to release any necessary information to [facility] and to Non-[facility] agencies as needed to facilitate patient care, treatment planning, and patient discharge planning.  Written and verbal resources provided.                 22 1227   NICU Assessment   Assessment Type Discharge Planning Assessment   Source of Information family   Verified Demographic and Insurance Information Yes   Insurance Medicaid;Commercial   Commercial [employer]   Guarantor Mother   Medicaid [employer]   Medicaid Insurance Primary   Lives With mother;grandmother   Name(s) of Who Lives With Patient Mya Zuluaga (mother), Sue Silveira (grandmother)   Relationship Status of Parents In relationship   Other children (include names and ages) none   Father's Involvement Fully Involved   Is Father signing the birth certificate Yes   Father Name and  Hemanth Vasquez   Family Involvement High   Primary Contact Name and Number Tonya Ayoub (mother) [phone]   Infant Feeding Plan breastfeeding   Does baby have crib or safe sleep space? Yes   Do you have a car seat? Yes   Resource/Environmental Concerns none   Resources/Education Provided Medicaid transportation;Glossary of Commonly Used Terms;Preparing for Your Baby's Discharge Home;Support Resources for NICU Families;Insurance Coverage of Breast Pumps and Supplies;WIC;Post Partum Depression   DCFS No indications (Indicators for Report)

## 2022-06-23 NOTE — PLAN OF CARE
Problem:  Fall Injury Risk  Goal: Absence of Fall, Infant Drop and Related Injury  Outcome: Ongoing, Progressing  Intervention: Promote Injury-Free Environment  Flowsheets (Taken 2022)  Safety Promotion/Fall Prevention:   assistive device/personal item within reach   nonskid shoes/socks when out of bed   side rails raised x 2     Problem: Infection  Goal: Absence of Infection Signs and Symptoms  Outcome: Ongoing, Progressing     Problem: Adult Inpatient Plan of Care  Goal: Plan of Care Review  Outcome: Ongoing, Progressing  Flowsheets (Taken 2022)  Plan of Care Reviewed With: patient  Goal: Patient-Specific Goal (Individualized)  Description: I want to get some rest  Outcome: Ongoing, Progressing  Flowsheets (Taken 2022)  Patient-Specific Goals (Include Timeframe): I want to get some rest  Goal: Absence of Hospital-Acquired Illness or Injury  Outcome: Ongoing, Progressing  Intervention: Identify and Manage Fall Risk  Flowsheets (Taken 2022)  Safety Promotion/Fall Prevention:   assistive device/personal item within reach   nonskid shoes/socks when out of bed   side rails raised x 2  Intervention: Prevent and Manage VTE (Venous Thromboembolism) Risk  Flowsheets (Taken 2022)  Range of Motion: active ROM (range of motion) encouraged  Intervention: Prevent Infection  Flowsheets (Taken 2022)  Infection Prevention:   hand hygiene promoted   rest/sleep promoted  Goal: Optimal Comfort and Wellbeing  Outcome: Ongoing, Progressing  Intervention: Monitor Pain and Promote Comfort  Flowsheets (Taken 2022)  Pain Management Interventions: pain management plan reviewed with patient/caregiver  Intervention: Provide Person-Centered Care  Flowsheets (Taken 2022)  Trust Relationship/Rapport:   care explained   questions answered   questions encouraged  Goal: Readiness for Transition of Care  Outcome: Ongoing, Progressing     Problem: Adjustment to  Role Transition (Postpartum Vaginal Delivery)  Goal: Successful Maternal Role Transition  Outcome: Ongoing, Progressing     Problem: Bleeding (Postpartum Vaginal Delivery)  Goal: Hemostasis  Outcome: Ongoing, Progressing  Intervention: Monitor and Manage Postpartum Bleeding  Flowsheets (Taken 2022)   Bleed Management: uterine massage provided (delivered)     Problem: Infection (Postpartum Vaginal Delivery)  Goal: Absence of Infection Signs/Symptoms  Outcome: Ongoing, Progressing     Problem: Pain (Postpartum Vaginal Delivery)  Goal: Acceptable Pain Control  Outcome: Ongoing, Progressing  Intervention: Prevent or Manage Pain  Flowsheets (Taken 2022)  Pain Management Interventions: pain management plan reviewed with patient/caregiver

## 2022-06-23 NOTE — DISCHARGE SUMMARY
Delivery Discharge Summary  Obstetrics      Primary OB Clinician: Carol Tucker MD    Discharge Provider: Jonas Clayton MD    Admission date: 2022  Discharge date: 2022    Admit Dx:   Discharge Dx:    Patient Active Problem List   Diagnosis    Vaginal delivery       Procedure:     Hospital Course:  Mya Ayoub is a 21 y.o. now  who was admitted on 2022 for delivery. Patient delivered a viable . Please see delivery note for further details. Pt was in stable condition post delivery and was transferred to the Mother-Baby Unit. Her postpartum course was uncomplicated. On the date of discharge, patient's pain is controlled with oral pain medications. She is tolerating ambulation without SOB or CP, and PO diet without N/V. Reported lochia is within the normal range. Pt in stable condition and ready for discharge.     Pertinent studies:  Postpartum CBC  Lab Results   Component Value Date    WBC 9.7 2022    HGB 12.1 2022    HCT 39.7 2022    MCV 84.1 2022     2022       Delivery:    Episiotomy: None   Lacerations: 2nd   Repair suture:     Repair # of packets:     Blood loss (ml):       Birth information:  YOB: 2024   Time of birth: 3:08 PM   Sex: male   Delivery type: Vaginal, Spontaneous   Gestational Age: 39w0d    Delivery Clinician:      Other providers:       Additional  information:  Forceps:    Vacuum:    Breech:    Observed anomalies      Living?:           APGARS  One minute Five minutes Ten minutes   Skin color:         Heart rate:         Grimace:         Muscle tone:         Breathing:         Totals: 1  2  7      Placenta: Delivered:       appearance      Disposition: To home, self care    Follow Up: 6 weeks    Patient Instructions:   1. Call the office for any bleeding >2 pads/hour for >2 hours, temperature >100.4, pain that is uncontrolled with medications, or for any other concerns.  2. Pelvic rest and no tub baths x 6  weeks.  3. No driving while on narcotics.    Current Discharge Medication List      START taking these medications    Details   acetaminophen (TYLENOL) 325 MG tablet Take 2 tablets (650 mg total) by mouth every 6 (six) hours as needed for Pain.  Qty: 30 tablet, Refills: 0    Comments: May take over the counter tylenol      ibuprofen (ADVIL,MOTRIN) 600 MG tablet Take 1 tablet (600 mg total) by mouth every 6 (six) hours as needed for Pain.  Qty: 30 tablet, Refills: 0         CONTINUE these medications which have CHANGED    Details   PNV,calcium 72/iron/folic acid (PRENATAL VITAMIN) Tab Take 1 tablet by mouth once daily.  Qty: 30 tablet, Refills: 11         STOP taking these medications       butalbital-acetaminophen-caff -40 mg Cap Comments:   Reason for Stopping:         NIFEdipine (PROCARDIA) 10 MG Cap Comments:   Reason for Stopping:         progesterone (PROMETRIUM) 200 MG capsule Comments:   Reason for Stopping:               Jonas Clayton

## 2022-06-24 ENCOUNTER — PATIENT OUTREACH (OUTPATIENT)
Dept: ADMINISTRATIVE | Facility: CLINIC | Age: 22
End: 2022-06-24
Payer: COMMERCIAL

## 2022-06-24 NOTE — PROGRESS NOTES
C3 nurse spoke with Mya Ayoub    for a TCC post hospital discharge follow up call. The patient does not have a scheduled HOSFU appointment with Carol Tucker MD   within 7 days post hospital discharge date 06/23/2022 states she feels depressed advised to call office to speak to staff and maybe make apt soon. C3 nurse was unable to schedule HOSFU appointment in [facility].

## 2025-06-03 ENCOUNTER — OFFICE VISIT (OUTPATIENT)
Dept: URGENT CARE | Facility: CLINIC | Age: 25
End: 2025-06-03
Payer: MEDICAID

## 2025-06-03 VITALS
OXYGEN SATURATION: 99 % | HEIGHT: 59 IN | RESPIRATION RATE: 18 BRPM | BODY MASS INDEX: 23.59 KG/M2 | SYSTOLIC BLOOD PRESSURE: 129 MMHG | HEART RATE: 94 BPM | DIASTOLIC BLOOD PRESSURE: 85 MMHG | TEMPERATURE: 99 F | WEIGHT: 117 LBS

## 2025-06-03 DIAGNOSIS — R09.81 CONGESTION OF PARANASAL SINUS: ICD-10-CM

## 2025-06-03 DIAGNOSIS — J02.9 SORE THROAT: Primary | ICD-10-CM

## 2025-06-03 LAB
CTP QC/QA: YES
CTP QC/QA: YES
MOLECULAR STREP A: NEGATIVE
SARS-COV+SARS-COV-2 AG RESP QL IA.RAPID: NEGATIVE

## 2025-06-03 PROCEDURE — 87651 STREP A DNA AMP PROBE: CPT | Mod: QW,,, | Performed by: FAMILY MEDICINE

## 2025-06-03 PROCEDURE — 99203 OFFICE O/P NEW LOW 30 MIN: CPT | Mod: ,,, | Performed by: FAMILY MEDICINE

## 2025-06-03 PROCEDURE — 87811 SARS-COV-2 COVID19 W/OPTIC: CPT | Mod: QW,,, | Performed by: FAMILY MEDICINE

## 2025-06-03 RX ORDER — PREDNISONE 20 MG/1
20 TABLET ORAL DAILY
Qty: 3 TABLET | Refills: 0 | Status: SHIPPED | OUTPATIENT
Start: 2025-06-03 | End: 2025-06-06

## 2025-06-03 RX ORDER — AMOXICILLIN AND CLAVULANATE POTASSIUM 875; 125 MG/1; MG/1
1 TABLET, FILM COATED ORAL EVERY 12 HOURS
Qty: 14 TABLET | Refills: 0 | Status: SHIPPED | OUTPATIENT
Start: 2025-06-03 | End: 2025-06-10

## 2025-06-03 RX ORDER — DEXAMETHASONE SODIUM PHOSPHATE 10 MG/ML
8 INJECTION INTRAMUSCULAR; INTRAVENOUS
Status: COMPLETED | OUTPATIENT
Start: 2025-06-03 | End: 2025-06-03

## 2025-06-03 RX ADMIN — DEXAMETHASONE SODIUM PHOSPHATE 8 MG: 10 INJECTION INTRAMUSCULAR; INTRAVENOUS at 07:06
